# Patient Record
Sex: FEMALE | Race: BLACK OR AFRICAN AMERICAN | NOT HISPANIC OR LATINO | Employment: FULL TIME
[De-identification: names, ages, dates, MRNs, and addresses within clinical notes are randomized per-mention and may not be internally consistent; named-entity substitution may affect disease eponyms.]

---

## 2019-01-01 ENCOUNTER — EXTERNAL RECORD (OUTPATIENT)
Dept: HEALTH INFORMATION MANAGEMENT | Facility: OTHER | Age: 34
End: 2019-01-01

## 2019-06-19 ENCOUNTER — E-ADVICE (OUTPATIENT)
Dept: ENDOCRINOLOGY | Age: 34
End: 2019-06-19

## 2019-06-19 ENCOUNTER — OFFICE VISIT (OUTPATIENT)
Dept: ENDOCRINOLOGY | Age: 34
End: 2019-06-19

## 2019-06-19 VITALS
HEART RATE: 83 BPM | HEIGHT: 68 IN | WEIGHT: 235.78 LBS | SYSTOLIC BLOOD PRESSURE: 112 MMHG | DIASTOLIC BLOOD PRESSURE: 78 MMHG | BODY MASS INDEX: 35.73 KG/M2

## 2019-06-19 DIAGNOSIS — E11.65 TYPE 2 DIABETES MELLITUS WITH HYPERGLYCEMIA, WITHOUT LONG-TERM CURRENT USE OF INSULIN (CMD): Primary | ICD-10-CM

## 2019-06-19 DIAGNOSIS — E78.5 HYPERLIPIDEMIA, UNSPECIFIED HYPERLIPIDEMIA TYPE: ICD-10-CM

## 2019-06-19 DIAGNOSIS — E01.0 THYROMEGALY: ICD-10-CM

## 2019-06-19 PROCEDURE — 3074F SYST BP LT 130 MM HG: CPT | Performed by: INTERNAL MEDICINE

## 2019-06-19 PROCEDURE — 99204 OFFICE O/P NEW MOD 45 MIN: CPT | Performed by: INTERNAL MEDICINE

## 2019-06-19 PROCEDURE — 3078F DIAST BP <80 MM HG: CPT | Performed by: INTERNAL MEDICINE

## 2019-06-19 RX ORDER — LANCETS
EACH MISCELLANEOUS
Qty: 100 EACH | Refills: 3 | Status: SHIPPED | OUTPATIENT
Start: 2019-06-19 | End: 2019-06-24 | Stop reason: ALTCHOICE

## 2019-06-19 RX ORDER — METFORMIN HYDROCHLORIDE 500 MG/1
500 TABLET, EXTENDED RELEASE ORAL 2 TIMES DAILY WITH MEALS
Qty: 180 TABLET | Refills: 1 | Status: SHIPPED | OUTPATIENT
Start: 2019-06-19 | End: 2020-01-10 | Stop reason: SDUPTHER

## 2019-06-21 ENCOUNTER — TELEPHONE (OUTPATIENT)
Dept: SCHEDULING | Age: 34
End: 2019-06-21

## 2019-06-24 ENCOUNTER — LAB SERVICES (OUTPATIENT)
Dept: LAB | Age: 34
End: 2019-06-24

## 2019-06-24 ENCOUNTER — E-ADVICE (OUTPATIENT)
Dept: ENDOCRINOLOGY | Age: 34
End: 2019-06-24

## 2019-06-24 DIAGNOSIS — E11.65 TYPE 2 DIABETES MELLITUS WITH HYPERGLYCEMIA, WITHOUT LONG-TERM CURRENT USE OF INSULIN (CMD): ICD-10-CM

## 2019-06-24 PROCEDURE — 36415 COLL VENOUS BLD VENIPUNCTURE: CPT

## 2019-06-24 PROCEDURE — 84443 ASSAY THYROID STIM HORMONE: CPT | Performed by: INTERNAL MEDICINE

## 2019-06-24 PROCEDURE — 82043 UR ALBUMIN QUANTITATIVE: CPT | Performed by: INTERNAL MEDICINE

## 2019-06-24 RX ORDER — LANCETS
EACH MISCELLANEOUS
Qty: 100 EACH | Refills: 5 | Status: SHIPPED | OUTPATIENT
Start: 2019-06-24 | End: 2020-01-10 | Stop reason: ALTCHOICE

## 2019-06-24 RX ORDER — BLOOD-GLUCOSE METER
1 EACH MISCELLANEOUS ONCE
Qty: 1 KIT | Refills: 0 | Status: SHIPPED | OUTPATIENT
Start: 2019-06-24 | End: 2019-06-24

## 2019-06-25 LAB
CREAT UR-MCNC: 167 MG/DL
MICROALBUMIN UR-MCNC: 1.84 MG/DL
MICROALBUMIN/CREAT UR: 11 MG/G
TSH SERPL-ACNC: 1.41 MCUNITS/ML (ref 0.35–5)

## 2019-06-29 ENCOUNTER — E-ADVICE (OUTPATIENT)
Dept: ENDOCRINOLOGY | Age: 34
End: 2019-06-29

## 2019-12-26 ENCOUNTER — E-ADVICE (OUTPATIENT)
Dept: ENDOCRINOLOGY | Age: 34
End: 2019-12-26

## 2020-01-08 ENCOUNTER — TELEPHONE (OUTPATIENT)
Dept: SCHEDULING | Age: 35
End: 2020-01-08

## 2020-01-10 ENCOUNTER — OFFICE VISIT (OUTPATIENT)
Dept: ENDOCRINOLOGY | Age: 35
End: 2020-01-10

## 2020-01-10 ENCOUNTER — LAB SERVICES (OUTPATIENT)
Dept: LAB | Age: 35
End: 2020-01-10

## 2020-01-10 VITALS
WEIGHT: 235.56 LBS | HEART RATE: 80 BPM | SYSTOLIC BLOOD PRESSURE: 110 MMHG | HEIGHT: 68 IN | DIASTOLIC BLOOD PRESSURE: 74 MMHG | BODY MASS INDEX: 35.7 KG/M2

## 2020-01-10 DIAGNOSIS — E11.65 TYPE 2 DIABETES MELLITUS WITH HYPERGLYCEMIA, WITHOUT LONG-TERM CURRENT USE OF INSULIN (CMD): ICD-10-CM

## 2020-01-10 DIAGNOSIS — E78.5 HYPERLIPIDEMIA, UNSPECIFIED HYPERLIPIDEMIA TYPE: ICD-10-CM

## 2020-01-10 DIAGNOSIS — E11.65 TYPE 2 DIABETES MELLITUS WITH HYPERGLYCEMIA, WITHOUT LONG-TERM CURRENT USE OF INSULIN (CMD): Primary | ICD-10-CM

## 2020-01-10 DIAGNOSIS — E01.0 THYROMEGALY: ICD-10-CM

## 2020-01-10 LAB — GLUCOSE SERPL-MCNC: 331 MG/DL (ref 65–99)

## 2020-01-10 PROCEDURE — 85025 COMPLETE CBC W/AUTO DIFF WBC: CPT | Performed by: INTERNAL MEDICINE

## 2020-01-10 PROCEDURE — 82962 GLUCOSE BLOOD TEST: CPT | Performed by: INTERNAL MEDICINE

## 2020-01-10 PROCEDURE — 36415 COLL VENOUS BLD VENIPUNCTURE: CPT

## 2020-01-10 PROCEDURE — 80053 COMPREHEN METABOLIC PANEL: CPT | Performed by: INTERNAL MEDICINE

## 2020-01-10 PROCEDURE — 3074F SYST BP LT 130 MM HG: CPT | Performed by: INTERNAL MEDICINE

## 2020-01-10 PROCEDURE — 3078F DIAST BP <80 MM HG: CPT | Performed by: INTERNAL MEDICINE

## 2020-01-10 PROCEDURE — 99214 OFFICE O/P EST MOD 30 MIN: CPT | Performed by: INTERNAL MEDICINE

## 2020-01-10 PROCEDURE — 80061 LIPID PANEL: CPT | Performed by: INTERNAL MEDICINE

## 2020-01-10 PROCEDURE — 83036 HEMOGLOBIN GLYCOSYLATED A1C: CPT | Performed by: INTERNAL MEDICINE

## 2020-01-10 RX ORDER — LANCETS 33 GAUGE
EACH MISCELLANEOUS
Qty: 100 EACH | Refills: 11 | Status: SHIPPED | OUTPATIENT
Start: 2020-01-10 | End: 2021-04-30 | Stop reason: ALTCHOICE

## 2020-01-10 RX ORDER — BLOOD PRESSURE TEST KIT
KIT MISCELLANEOUS
Qty: 100 EACH | Refills: 11 | Status: SHIPPED | OUTPATIENT
Start: 2020-01-10 | End: 2021-04-30 | Stop reason: ALTCHOICE

## 2020-01-10 RX ORDER — METFORMIN HYDROCHLORIDE 500 MG/1
1000 TABLET, EXTENDED RELEASE ORAL 2 TIMES DAILY WITH MEALS
Qty: 360 TABLET | Refills: 1 | Status: SHIPPED | OUTPATIENT
Start: 2020-01-10 | End: 2020-07-16

## 2020-01-11 LAB
ALBUMIN SERPL-MCNC: 4.2 G/DL (ref 3.6–5.1)
ALBUMIN/GLOB SERPL: 1.2 {RATIO} (ref 1–2.4)
ALP SERPL-CCNC: 63 UNITS/L (ref 45–117)
ALT SERPL-CCNC: 49 UNITS/L
ANION GAP SERPL CALC-SCNC: 12 MMOL/L (ref 10–20)
AST SERPL-CCNC: 29 UNITS/L
BASOPHILS # BLD AUTO: 0.1 K/MCL (ref 0–0.3)
BASOPHILS NFR BLD AUTO: 1 %
BILIRUB SERPL-MCNC: 0.7 MG/DL (ref 0.2–1)
BUN SERPL-MCNC: 7 MG/DL (ref 6–20)
BUN/CREAT SERPL: 13 (ref 7–25)
CALCIUM SERPL-MCNC: 9.2 MG/DL (ref 8.4–10.2)
CHLORIDE SERPL-SCNC: 101 MMOL/L (ref 98–107)
CHOLEST SERPL-MCNC: 194 MG/DL
CHOLEST/HDLC SERPL: 4.4 {RATIO}
CO2 SERPL-SCNC: 27 MMOL/L (ref 21–32)
CREAT SERPL-MCNC: 0.56 MG/DL (ref 0.51–0.95)
DIFFERENTIAL METHOD BLD: NORMAL
EOSINOPHIL # BLD AUTO: 0.1 K/MCL (ref 0.1–0.5)
EOSINOPHIL NFR SPEC: 1 %
ERYTHROCYTE [DISTWIDTH] IN BLOOD: 12.9 % (ref 11–15)
FASTING STATUS PATIENT QL REPORTED: ABNORMAL HRS
GLOBULIN SER-MCNC: 3.5 G/DL (ref 2–4)
GLUCOSE SERPL-MCNC: 305 MG/DL (ref 65–99)
HBA1C MFR BLD: 11.2 % (ref 4.5–5.6)
HCT VFR BLD CALC: 43.8 % (ref 36–46.5)
HDLC SERPL-MCNC: 44 MG/DL
HGB BLD-MCNC: 14.6 G/DL (ref 12–15.5)
IMM GRANULOCYTES # BLD AUTO: 0 K/MCL (ref 0–0.2)
IMM GRANULOCYTES NFR BLD: 0 %
LDLC SERPL-MCNC: 118 MG/DL
LENGTH OF FAST TIME PATIENT: ABNORMAL HRS
LYMPHOCYTES # BLD MANUAL: 1.9 K/MCL (ref 1–4.8)
LYMPHOCYTES NFR BLD MANUAL: 38 %
MCH RBC QN AUTO: 30.6 PG (ref 26–34)
MCHC RBC AUTO-ENTMCNC: 33.3 G/DL (ref 32–36.5)
MCV RBC AUTO: 91.8 FL (ref 78–100)
MONOCYTES # BLD MANUAL: 0.5 K/MCL (ref 0.3–0.9)
MONOCYTES NFR BLD MANUAL: 10 %
NEUTROPHILS # BLD: 2.4 K/MCL (ref 1.8–7.7)
NEUTROPHILS NFR BLD AUTO: 50 %
NONHDLC SERPL-MCNC: 150 MG/DL
NRBC BLD MANUAL-RTO: 0 /100 WBC
PLATELET # BLD: 269 K/MCL (ref 140–450)
POTASSIUM SERPL-SCNC: 4.4 MMOL/L (ref 3.4–5.1)
PROT SERPL-MCNC: 7.7 G/DL (ref 6.4–8.2)
RBC # BLD: 4.77 MIL/MCL (ref 4–5.2)
SODIUM SERPL-SCNC: 136 MMOL/L (ref 135–145)
TRIGL SERPL-MCNC: 161 MG/DL
WBC # BLD: 4.9 K/MCL (ref 4.2–11)

## 2020-02-07 ENCOUNTER — E-ADVICE (OUTPATIENT)
Dept: ENDOCRINOLOGY | Age: 35
End: 2020-02-07

## 2020-07-16 DIAGNOSIS — E11.65 TYPE 2 DIABETES MELLITUS WITH HYPERGLYCEMIA, WITHOUT LONG-TERM CURRENT USE OF INSULIN (CMD): ICD-10-CM

## 2020-07-16 RX ORDER — METFORMIN HYDROCHLORIDE 500 MG/1
TABLET, EXTENDED RELEASE ORAL
Qty: 360 TABLET | Refills: 1 | Status: ON HOLD | OUTPATIENT
Start: 2020-07-16 | End: 2022-04-04 | Stop reason: ALTCHOICE

## 2021-01-24 DIAGNOSIS — E11.65 TYPE 2 DIABETES MELLITUS WITH HYPERGLYCEMIA, WITHOUT LONG-TERM CURRENT USE OF INSULIN (CMD): ICD-10-CM

## 2021-01-25 RX ORDER — DULAGLUTIDE 0.75 MG/.5ML
INJECTION, SOLUTION SUBCUTANEOUS
Qty: 2 ML | Refills: 1 | OUTPATIENT
Start: 2021-01-25

## 2021-01-26 ENCOUNTER — TELEPHONE (OUTPATIENT)
Dept: FAMILY MEDICINE | Age: 36
End: 2021-01-26

## 2021-01-26 DIAGNOSIS — E11.65 TYPE 2 DIABETES MELLITUS WITH HYPERGLYCEMIA, WITHOUT LONG-TERM CURRENT USE OF INSULIN (CMD): Primary | ICD-10-CM

## 2021-01-27 DIAGNOSIS — E11.65 TYPE 2 DIABETES MELLITUS WITH HYPERGLYCEMIA, WITHOUT LONG-TERM CURRENT USE OF INSULIN (CMD): ICD-10-CM

## 2021-03-16 ENCOUNTER — TELEPHONE (OUTPATIENT)
Dept: SCHEDULING | Age: 36
End: 2021-03-16

## 2021-03-17 ENCOUNTER — APPOINTMENT (OUTPATIENT)
Dept: FAMILY MEDICINE | Age: 36
End: 2021-03-17

## 2021-04-02 ENCOUNTER — OFFICE VISIT (OUTPATIENT)
Dept: FAMILY MEDICINE | Age: 36
End: 2021-04-02

## 2021-04-02 VITALS
WEIGHT: 249.16 LBS | HEART RATE: 94 BPM | OXYGEN SATURATION: 96 % | BODY MASS INDEX: 37.76 KG/M2 | HEIGHT: 68 IN | DIASTOLIC BLOOD PRESSURE: 90 MMHG | SYSTOLIC BLOOD PRESSURE: 164 MMHG

## 2021-04-02 DIAGNOSIS — E11.65 TYPE 2 DIABETES MELLITUS WITH HYPERGLYCEMIA, WITHOUT LONG-TERM CURRENT USE OF INSULIN (CMD): ICD-10-CM

## 2021-04-02 DIAGNOSIS — R03.0 ELEVATED BLOOD PRESSURE READING WITHOUT DIAGNOSIS OF HYPERTENSION: ICD-10-CM

## 2021-04-02 DIAGNOSIS — R19.5 MUCOUS IN STOOLS: Primary | ICD-10-CM

## 2021-04-02 DIAGNOSIS — E66.01 CLASS 2 SEVERE OBESITY DUE TO EXCESS CALORIES WITH SERIOUS COMORBIDITY AND BODY MASS INDEX (BMI) OF 37.0 TO 37.9 IN ADULT (CMD): ICD-10-CM

## 2021-04-02 PROBLEM — E66.812 CLASS 2 SEVERE OBESITY DUE TO EXCESS CALORIES WITH SERIOUS COMORBIDITY AND BODY MASS INDEX (BMI) OF 37.0 TO 37.9 IN ADULT (CMD): Status: ACTIVE | Noted: 2021-04-02

## 2021-04-02 PROCEDURE — 99203 OFFICE O/P NEW LOW 30 MIN: CPT | Performed by: FAMILY MEDICINE

## 2021-04-02 PROCEDURE — 3077F SYST BP >= 140 MM HG: CPT | Performed by: FAMILY MEDICINE

## 2021-04-02 SDOH — HEALTH STABILITY: MENTAL HEALTH: HOW OFTEN DO YOU HAVE A DRINK CONTAINING ALCOHOL?: NEVER

## 2021-04-02 ASSESSMENT — PATIENT HEALTH QUESTIONNAIRE - PHQ9
1. LITTLE INTEREST OR PLEASURE IN DOING THINGS: NOT AT ALL
2. FEELING DOWN, DEPRESSED OR HOPELESS: NOT AT ALL
SUM OF ALL RESPONSES TO PHQ9 QUESTIONS 1 AND 2: 0
SUM OF ALL RESPONSES TO PHQ9 QUESTIONS 1 AND 2: 0
CLINICAL INTERPRETATION OF PHQ9 SCORE: NO FURTHER SCREENING NEEDED
CLINICAL INTERPRETATION OF PHQ2 SCORE: NO FURTHER SCREENING NEEDED

## 2021-04-05 ENCOUNTER — TELEPHONE (OUTPATIENT)
Dept: SCHEDULING | Age: 36
End: 2021-04-05

## 2021-04-08 ASSESSMENT — ENCOUNTER SYMPTOMS
ACTIVITY CHANGE: 0
EYE PAIN: 0
SHORTNESS OF BREATH: 0
WEAKNESS: 0
DIARRHEA: 0
CHILLS: 0
VOMITING: 0
CONFUSION: 0
WHEEZING: 0
DIZZINESS: 0
RHINORRHEA: 0
EYE DISCHARGE: 0
ABDOMINAL PAIN: 0
WOUND: 0
SORE THROAT: 0
AGITATION: 0
SLEEP DISTURBANCE: 0
NUMBNESS: 0
FEVER: 0
HEADACHES: 0
NAUSEA: 0
CONSTIPATION: 0
COUGH: 0
ROS GI COMMENTS: NEGATIVE EXCEPT HPI

## 2021-04-09 ENCOUNTER — TELEPHONE (OUTPATIENT)
Dept: SCHEDULING | Age: 36
End: 2021-04-09

## 2021-04-15 ENCOUNTER — LAB SERVICES (OUTPATIENT)
Dept: LAB | Age: 36
End: 2021-04-15

## 2021-04-15 DIAGNOSIS — E11.65 TYPE 2 DIABETES MELLITUS WITH HYPERGLYCEMIA, WITHOUT LONG-TERM CURRENT USE OF INSULIN (CMD): ICD-10-CM

## 2021-04-15 DIAGNOSIS — R19.5 MUCOUS IN STOOLS: ICD-10-CM

## 2021-04-15 DIAGNOSIS — R03.0 ELEVATED BLOOD PRESSURE READING WITHOUT DIAGNOSIS OF HYPERTENSION: ICD-10-CM

## 2021-04-15 PROCEDURE — 82570 ASSAY OF URINE CREATININE: CPT | Performed by: INTERNAL MEDICINE

## 2021-04-15 PROCEDURE — 82306 VITAMIN D 25 HYDROXY: CPT | Performed by: FAMILY MEDICINE

## 2021-04-15 PROCEDURE — 36415 COLL VENOUS BLD VENIPUNCTURE: CPT

## 2021-04-15 PROCEDURE — 82043 UR ALBUMIN QUANTITATIVE: CPT | Performed by: INTERNAL MEDICINE

## 2021-04-15 PROCEDURE — 80061 LIPID PANEL: CPT | Performed by: INTERNAL MEDICINE

## 2021-04-15 PROCEDURE — 83036 HEMOGLOBIN GLYCOSYLATED A1C: CPT | Performed by: INTERNAL MEDICINE

## 2021-04-15 PROCEDURE — 85027 COMPLETE CBC AUTOMATED: CPT | Performed by: FAMILY MEDICINE

## 2021-04-15 PROCEDURE — 86140 C-REACTIVE PROTEIN: CPT | Performed by: FAMILY MEDICINE

## 2021-04-15 PROCEDURE — 82746 ASSAY OF FOLIC ACID SERUM: CPT | Performed by: FAMILY MEDICINE

## 2021-04-15 PROCEDURE — 84443 ASSAY THYROID STIM HORMONE: CPT | Performed by: FAMILY MEDICINE

## 2021-04-15 PROCEDURE — 80053 COMPREHEN METABOLIC PANEL: CPT | Performed by: INTERNAL MEDICINE

## 2021-04-15 PROCEDURE — 82607 VITAMIN B-12: CPT | Performed by: FAMILY MEDICINE

## 2021-04-16 LAB
25(OH)D3+25(OH)D2 SERPL-MCNC: 6.5 NG/ML (ref 30–100)
ALBUMIN SERPL-MCNC: 4 G/DL (ref 3.6–5.1)
ALBUMIN/GLOB SERPL: 1.1 {RATIO} (ref 1–2.4)
ALP SERPL-CCNC: 59 UNITS/L (ref 45–117)
ALT SERPL-CCNC: 63 UNITS/L
ANION GAP SERPL CALC-SCNC: 10 MMOL/L (ref 10–20)
AST SERPL-CCNC: 38 UNITS/L
BILIRUB SERPL-MCNC: 0.3 MG/DL (ref 0.2–1)
BUN SERPL-MCNC: 9 MG/DL (ref 6–20)
BUN/CREAT SERPL: 14 (ref 7–25)
CALCIUM SERPL-MCNC: 9.5 MG/DL (ref 8.4–10.2)
CHLORIDE SERPL-SCNC: 104 MMOL/L (ref 98–107)
CHOLEST SERPL-MCNC: 183 MG/DL
CHOLEST/HDLC SERPL: 4.5 {RATIO}
CO2 SERPL-SCNC: 28 MMOL/L (ref 21–32)
CREAT SERPL-MCNC: 0.63 MG/DL (ref 0.51–0.95)
CREAT UR-MCNC: 245 MG/DL
CRP SERPL-MCNC: 0.8 MG/DL
DEPRECATED RDW RBC: 42.4 FL (ref 39–50)
ERYTHROCYTE [DISTWIDTH] IN BLOOD: 12.8 % (ref 11–15)
FASTING DURATION TIME PATIENT: ABNORMAL H
FASTING DURATION TIME PATIENT: ABNORMAL H
FOLATE SERPL-MCNC: >24 NG/ML
GFR SERPLBLD BASED ON 1.73 SQ M-ARVRAT: >90 ML/MIN/1.73M2
GLOBULIN SER-MCNC: 3.5 G/DL (ref 2–4)
GLUCOSE SERPL-MCNC: 215 MG/DL (ref 65–99)
HBA1C MFR BLD: 9.4 % (ref 4.5–5.6)
HCT VFR BLD CALC: 43 % (ref 36–46.5)
HDLC SERPL-MCNC: 41 MG/DL
HGB BLD-MCNC: 14.2 G/DL (ref 12–15.5)
LDLC SERPL CALC-MCNC: 100 MG/DL
MCH RBC QN AUTO: 30.3 PG (ref 26–34)
MCHC RBC AUTO-ENTMCNC: 33 G/DL (ref 32–36.5)
MCV RBC AUTO: 91.7 FL (ref 78–100)
MICROALBUMIN UR-MCNC: 2.21 MG/DL
MICROALBUMIN/CREAT UR: 9 MG/G
NONHDLC SERPL-MCNC: 142 MG/DL
NRBC BLD MANUAL-RTO: 0 /100 WBC
PLATELET # BLD AUTO: 313 K/MCL (ref 140–450)
POTASSIUM SERPL-SCNC: 3.9 MMOL/L (ref 3.4–5.1)
PROT SERPL-MCNC: 7.5 G/DL (ref 6.4–8.2)
RBC # BLD: 4.69 MIL/MCL (ref 4–5.2)
SODIUM SERPL-SCNC: 138 MMOL/L (ref 135–145)
TRIGL SERPL-MCNC: 208 MG/DL
TSH SERPL-ACNC: 0.98 MCUNITS/ML (ref 0.35–5)
VIT B12 SERPL-MCNC: 579 PG/ML (ref 211–911)
WBC # BLD: 5.7 K/MCL (ref 4.2–11)

## 2021-04-17 RX ORDER — ERGOCALCIFEROL 1.25 MG/1
50000 CAPSULE ORAL
Qty: 8 CAPSULE | Refills: 0 | Status: SHIPPED | OUTPATIENT
Start: 2021-04-19 | End: 2021-05-07

## 2021-04-30 ENCOUNTER — E-ADVICE (OUTPATIENT)
Dept: ENDOCRINOLOGY | Age: 36
End: 2021-04-30

## 2021-04-30 ENCOUNTER — OFFICE VISIT (OUTPATIENT)
Dept: ENDOCRINOLOGY | Age: 36
End: 2021-04-30

## 2021-04-30 VITALS
BODY MASS INDEX: 37.46 KG/M2 | WEIGHT: 247.14 LBS | DIASTOLIC BLOOD PRESSURE: 80 MMHG | SYSTOLIC BLOOD PRESSURE: 122 MMHG | HEIGHT: 68 IN | HEART RATE: 90 BPM

## 2021-04-30 DIAGNOSIS — E78.5 HYPERLIPIDEMIA, UNSPECIFIED HYPERLIPIDEMIA TYPE: ICD-10-CM

## 2021-04-30 DIAGNOSIS — E11.65 TYPE 2 DIABETES MELLITUS WITH HYPERGLYCEMIA, WITHOUT LONG-TERM CURRENT USE OF INSULIN (CMD): Primary | ICD-10-CM

## 2021-04-30 DIAGNOSIS — Z30.02 COUNSELING IN NATURAL FAMILY PLANNING TO AVOID PREGNANCY: ICD-10-CM

## 2021-04-30 PROCEDURE — 3079F DIAST BP 80-89 MM HG: CPT | Performed by: INTERNAL MEDICINE

## 2021-04-30 PROCEDURE — 3074F SYST BP LT 130 MM HG: CPT | Performed by: INTERNAL MEDICINE

## 2021-04-30 PROCEDURE — 99214 OFFICE O/P EST MOD 30 MIN: CPT | Performed by: INTERNAL MEDICINE

## 2021-04-30 RX ORDER — INSULIN DETEMIR 100 [IU]/ML
INJECTION, SOLUTION SUBCUTANEOUS
Qty: 15 ML | Refills: 5 | Status: ON HOLD | OUTPATIENT
Start: 2021-04-30 | End: 2022-04-04 | Stop reason: ALTCHOICE

## 2021-04-30 RX ORDER — INSULIN ASPART 100 [IU]/ML
INJECTION, SOLUTION INTRAVENOUS; SUBCUTANEOUS
Qty: 15 ML | Refills: 5 | Status: ON HOLD | OUTPATIENT
Start: 2021-04-30 | End: 2022-04-07 | Stop reason: HOSPADM

## 2021-04-30 ASSESSMENT — PATIENT HEALTH QUESTIONNAIRE - PHQ9
CLINICAL INTERPRETATION OF PHQ2 SCORE: NO FURTHER SCREENING NEEDED
CLINICAL INTERPRETATION OF PHQ9 SCORE: NO FURTHER SCREENING NEEDED
1. LITTLE INTEREST OR PLEASURE IN DOING THINGS: NOT AT ALL
2. FEELING DOWN, DEPRESSED OR HOPELESS: NOT AT ALL
SUM OF ALL RESPONSES TO PHQ9 QUESTIONS 1 AND 2: 0
SUM OF ALL RESPONSES TO PHQ9 QUESTIONS 1 AND 2: 0

## 2021-05-07 RX ORDER — MULTIVIT-MIN/IRON/FOLIC ACID/K 18-600-40
50 CAPSULE ORAL DAILY
Qty: 90 CAPSULE | Refills: 3 | Status: ON HOLD | OUTPATIENT
Start: 2021-05-07 | End: 2022-04-04 | Stop reason: ALTCHOICE

## 2021-05-11 ENCOUNTER — APPOINTMENT (OUTPATIENT)
Dept: EDUCATION SERVICES | Age: 36
End: 2021-05-11
Attending: INTERNAL MEDICINE

## 2021-05-12 DIAGNOSIS — E11.65 TYPE 2 DIABETES MELLITUS WITH HYPERGLYCEMIA, WITHOUT LONG-TERM CURRENT USE OF INSULIN (CMD): ICD-10-CM

## 2021-05-12 RX ORDER — DULAGLUTIDE 0.75 MG/.5ML
INJECTION, SOLUTION SUBCUTANEOUS
Qty: 2 ML | Refills: 1 | OUTPATIENT
Start: 2021-05-12

## 2021-05-27 ENCOUNTER — TELEPHONE (OUTPATIENT)
Dept: GASTROENTEROLOGY | Age: 36
End: 2021-05-27

## 2021-06-01 ENCOUNTER — APPOINTMENT (OUTPATIENT)
Dept: GASTROENTEROLOGY | Age: 36
End: 2021-06-01

## 2021-06-28 ENCOUNTER — OFFICE VISIT (OUTPATIENT)
Dept: FAMILY MEDICINE | Age: 36
End: 2021-06-28

## 2021-06-28 ENCOUNTER — TELEPHONE (OUTPATIENT)
Dept: FAMILY MEDICINE | Age: 36
End: 2021-06-28

## 2021-06-28 VITALS
HEIGHT: 68 IN | OXYGEN SATURATION: 98 % | BODY MASS INDEX: 36.27 KG/M2 | WEIGHT: 239.33 LBS | HEART RATE: 67 BPM | SYSTOLIC BLOOD PRESSURE: 113 MMHG | DIASTOLIC BLOOD PRESSURE: 62 MMHG | TEMPERATURE: 98 F | RESPIRATION RATE: 18 BRPM

## 2021-06-28 DIAGNOSIS — R03.0 ELEVATED BLOOD PRESSURE READING WITHOUT DIAGNOSIS OF HYPERTENSION: ICD-10-CM

## 2021-06-28 DIAGNOSIS — E11.65 TYPE 2 DIABETES MELLITUS WITH HYPERGLYCEMIA, WITHOUT LONG-TERM CURRENT USE OF INSULIN (CMD): Primary | ICD-10-CM

## 2021-06-28 PROCEDURE — 99214 OFFICE O/P EST MOD 30 MIN: CPT | Performed by: FAMILY MEDICINE

## 2021-06-28 PROCEDURE — 3078F DIAST BP <80 MM HG: CPT | Performed by: FAMILY MEDICINE

## 2021-06-28 PROCEDURE — 3074F SYST BP LT 130 MM HG: CPT | Performed by: FAMILY MEDICINE

## 2021-06-28 RX ORDER — FLUCONAZOLE 150 MG/1
150 TABLET ORAL ONCE
Qty: 1 TABLET | Refills: 0 | Status: SHIPPED | OUTPATIENT
Start: 2021-06-28 | End: 2021-06-28

## 2021-06-28 ASSESSMENT — PATIENT HEALTH QUESTIONNAIRE - PHQ9
CLINICAL INTERPRETATION OF PHQ2 SCORE: NO FURTHER SCREENING NEEDED
SUM OF ALL RESPONSES TO PHQ9 QUESTIONS 1 AND 2: 2
1. LITTLE INTEREST OR PLEASURE IN DOING THINGS: SEVERAL DAYS
CLINICAL INTERPRETATION OF PHQ9 SCORE: NO FURTHER SCREENING NEEDED
2. FEELING DOWN, DEPRESSED OR HOPELESS: SEVERAL DAYS
SUM OF ALL RESPONSES TO PHQ9 QUESTIONS 1 AND 2: 2

## 2021-06-28 ASSESSMENT — ENCOUNTER SYMPTOMS
ENDOCRINE NEGATIVE: 1
CONSTITUTIONAL NEGATIVE: 1
PSYCHIATRIC NEGATIVE: 1
ALLERGIC/IMMUNOLOGIC NEGATIVE: 1
HEMATOLOGIC/LYMPHATIC NEGATIVE: 1
EYES NEGATIVE: 1
NEUROLOGICAL NEGATIVE: 1
RESPIRATORY NEGATIVE: 1
GASTROINTESTINAL NEGATIVE: 1

## 2021-06-28 ASSESSMENT — PAIN SCALES - GENERAL: PAINLEVEL: 0

## 2021-08-22 ENCOUNTER — HOSPITAL ENCOUNTER (EMERGENCY)
Age: 36
Discharge: HOME | End: 2021-08-22
Attending: EMERGENCY MEDICINE

## 2021-08-22 VITALS
TEMPERATURE: 96 F | DIASTOLIC BLOOD PRESSURE: 66 MMHG | OXYGEN SATURATION: 98 % | SYSTOLIC BLOOD PRESSURE: 122 MMHG | HEART RATE: 83 BPM | BODY MASS INDEX: 37.07 KG/M2 | WEIGHT: 243.83 LBS | RESPIRATION RATE: 16 BRPM

## 2021-08-22 DIAGNOSIS — Z33.1 PREGNANCY AS INCIDENTAL FINDING: ICD-10-CM

## 2021-08-22 DIAGNOSIS — R03.0 ELEVATED BLOOD PRESSURE READING WITHOUT DIAGNOSIS OF HYPERTENSION: ICD-10-CM

## 2021-08-22 DIAGNOSIS — R51.9 ACUTE NONINTRACTABLE HEADACHE, UNSPECIFIED HEADACHE TYPE: Primary | ICD-10-CM

## 2021-08-22 LAB
ALBUMIN SERPL-MCNC: 3.7 G/DL (ref 3.6–5.1)
ALBUMIN/GLOB SERPL: 1 {RATIO} (ref 1–2.4)
ALP SERPL-CCNC: 48 UNITS/L (ref 45–117)
ALT SERPL-CCNC: 34 UNITS/L
ANION GAP SERPL CALC-SCNC: 10 MMOL/L (ref 10–20)
APPEARANCE UR: CLEAR
AST SERPL-CCNC: 17 UNITS/L
BACTERIA #/AREA URNS HPF: ABNORMAL /HPF
BASOPHILS # BLD: 0.1 K/MCL (ref 0–0.3)
BASOPHILS NFR BLD: 1 %
BILIRUB SERPL-MCNC: 0.3 MG/DL (ref 0.2–1)
BILIRUB UR QL STRIP: NEGATIVE
BUN SERPL-MCNC: 9 MG/DL (ref 6–20)
BUN/CREAT SERPL: 16 (ref 7–25)
CALCIUM SERPL-MCNC: 9 MG/DL (ref 8.4–10.2)
CHLORIDE SERPL-SCNC: 102 MMOL/L (ref 98–107)
CO2 SERPL-SCNC: 27 MMOL/L (ref 21–32)
COLOR UR: YELLOW
CREAT SERPL-MCNC: 0.56 MG/DL (ref 0.51–0.95)
DEPRECATED RDW RBC: 39 FL (ref 39–50)
EOSINOPHIL # BLD: 0.1 K/MCL (ref 0–0.5)
EOSINOPHIL NFR BLD: 1 %
ERYTHROCYTE [DISTWIDTH] IN BLOOD: 12.3 % (ref 11–15)
FASTING DURATION TIME PATIENT: ABNORMAL H
GFR SERPLBLD BASED ON 1.73 SQ M-ARVRAT: >90 ML/MIN/1.73M2
GLOBULIN SER-MCNC: 3.7 G/DL (ref 2–4)
GLUCOSE SERPL-MCNC: 180 MG/DL (ref 65–99)
GLUCOSE UR STRIP-MCNC: 500 MG/DL
HCG SERPL-ACNC: 9647 MUNITS/ML
HCG UR QL: POSITIVE
HCT VFR BLD CALC: 38.4 % (ref 36–46.5)
HGB BLD-MCNC: 13.6 G/DL (ref 12–15.5)
HGB UR QL STRIP: NEGATIVE
HYALINE CASTS #/AREA URNS LPF: ABNORMAL /LPF
IMM GRANULOCYTES # BLD AUTO: 0 K/MCL (ref 0–0.2)
IMM GRANULOCYTES # BLD: 0 %
KETONES UR STRIP-MCNC: 15 MG/DL
LEUKOCYTE ESTERASE UR QL STRIP: NEGATIVE
LYMPHOCYTES # BLD: 2.3 K/MCL (ref 1–4.8)
LYMPHOCYTES NFR BLD: 29 %
MAGNESIUM SERPL-MCNC: 1.9 MG/DL (ref 1.7–2.4)
MCH RBC QN AUTO: 30.5 PG (ref 26–34)
MCHC RBC AUTO-ENTMCNC: 35.4 G/DL (ref 32–36.5)
MCV RBC AUTO: 86.1 FL (ref 78–100)
MONOCYTES # BLD: 0.7 K/MCL (ref 0.3–0.9)
MONOCYTES NFR BLD: 9 %
NEUTROPHILS # BLD: 4.8 K/MCL (ref 1.8–7.7)
NEUTROPHILS NFR BLD: 60 %
NITRITE UR QL STRIP: NEGATIVE
NRBC BLD MANUAL-RTO: 0 /100 WBC
PH UR STRIP: 5.5 [PH] (ref 5–7)
PLATELET # BLD AUTO: 287 K/MCL (ref 140–450)
POTASSIUM SERPL-SCNC: 3.6 MMOL/L (ref 3.4–5.1)
PROT SERPL-MCNC: 7.4 G/DL (ref 6.4–8.2)
PROT UR STRIP-MCNC: NEGATIVE MG/DL
RBC # BLD: 4.46 MIL/MCL (ref 4–5.2)
RBC #/AREA URNS HPF: ABNORMAL /HPF
SODIUM SERPL-SCNC: 135 MMOL/L (ref 135–145)
SP GR UR STRIP: >1.03 (ref 1–1.03)
SQUAMOUS #/AREA URNS HPF: ABNORMAL /HPF
UROBILINOGEN UR STRIP-MCNC: 0.2 MG/DL
WBC # BLD: 7.9 K/MCL (ref 4.2–11)
WBC #/AREA URNS HPF: ABNORMAL /HPF

## 2021-08-22 PROCEDURE — 96376 TX/PRO/DX INJ SAME DRUG ADON: CPT

## 2021-08-22 PROCEDURE — 99283 EMERGENCY DEPT VISIT LOW MDM: CPT

## 2021-08-22 PROCEDURE — 10002803 HB RX 637: Performed by: EMERGENCY MEDICINE

## 2021-08-22 PROCEDURE — 96375 TX/PRO/DX INJ NEW DRUG ADDON: CPT

## 2021-08-22 PROCEDURE — 10002800 HB RX 250 W HCPCS: Performed by: EMERGENCY MEDICINE

## 2021-08-22 PROCEDURE — 84703 CHORIONIC GONADOTROPIN ASSAY: CPT

## 2021-08-22 PROCEDURE — 85025 COMPLETE CBC W/AUTO DIFF WBC: CPT | Performed by: EMERGENCY MEDICINE

## 2021-08-22 PROCEDURE — 84702 CHORIONIC GONADOTROPIN TEST: CPT | Performed by: EMERGENCY MEDICINE

## 2021-08-22 PROCEDURE — 80053 COMPREHEN METABOLIC PANEL: CPT | Performed by: EMERGENCY MEDICINE

## 2021-08-22 PROCEDURE — 81001 URINALYSIS AUTO W/SCOPE: CPT | Performed by: EMERGENCY MEDICINE

## 2021-08-22 PROCEDURE — 10002807 HB RX 258: Performed by: EMERGENCY MEDICINE

## 2021-08-22 PROCEDURE — 83735 ASSAY OF MAGNESIUM: CPT | Performed by: EMERGENCY MEDICINE

## 2021-08-22 PROCEDURE — 96361 HYDRATE IV INFUSION ADD-ON: CPT

## 2021-08-22 PROCEDURE — 99283 EMERGENCY DEPT VISIT LOW MDM: CPT | Performed by: EMERGENCY MEDICINE

## 2021-08-22 PROCEDURE — 96365 THER/PROPH/DIAG IV INF INIT: CPT

## 2021-08-22 RX ORDER — DIPHENHYDRAMINE HYDROCHLORIDE 50 MG/ML
25 INJECTION INTRAMUSCULAR; INTRAVENOUS ONCE
Status: COMPLETED | OUTPATIENT
Start: 2021-08-22 | End: 2021-08-22

## 2021-08-22 RX ORDER — METOCLOPRAMIDE HYDROCHLORIDE 5 MG/ML
5 INJECTION INTRAMUSCULAR; INTRAVENOUS ONCE
Status: COMPLETED | OUTPATIENT
Start: 2021-08-22 | End: 2021-08-22

## 2021-08-22 RX ORDER — DEXAMETHASONE SODIUM PHOSPHATE 4 MG/ML
8 INJECTION, SOLUTION INTRA-ARTICULAR; INTRALESIONAL; INTRAMUSCULAR; INTRAVENOUS; SOFT TISSUE ONCE
Status: DISCONTINUED | OUTPATIENT
Start: 2021-08-22 | End: 2021-08-23 | Stop reason: HOSPADM

## 2021-08-22 RX ORDER — IBUPROFEN 600 MG/1
600 TABLET ORAL ONCE
Status: COMPLETED | OUTPATIENT
Start: 2021-08-22 | End: 2021-08-22

## 2021-08-22 RX ADMIN — METOCLOPRAMIDE HYDROCHLORIDE 5 MG: 5 INJECTION INTRAMUSCULAR; INTRAVENOUS at 19:04

## 2021-08-22 RX ADMIN — DIPHENHYDRAMINE HYDROCHLORIDE 25 MG: 50 INJECTION INTRAMUSCULAR; INTRAVENOUS at 17:52

## 2021-08-22 RX ADMIN — METOCLOPRAMIDE HYDROCHLORIDE 5 MG: 5 INJECTION INTRAMUSCULAR; INTRAVENOUS at 17:53

## 2021-08-22 RX ADMIN — IBUPROFEN 600 MG: 600 TABLET ORAL at 16:07

## 2021-08-22 RX ADMIN — DIPHENHYDRAMINE HYDROCHLORIDE 25 MG: 50 INJECTION INTRAMUSCULAR; INTRAVENOUS at 19:05

## 2021-08-22 RX ADMIN — MAGNESIUM SULFATE HEPTAHYDRATE 2 G: 40 INJECTION, SOLUTION INTRAVENOUS at 17:50

## 2021-08-22 RX ADMIN — SODIUM CHLORIDE 1000 ML: 0.9 INJECTION, SOLUTION INTRAVENOUS at 17:51

## 2021-08-22 ASSESSMENT — ENCOUNTER SYMPTOMS
BRUISES/BLEEDS EASILY: 0
SEIZURES: 0
HEADACHES: 1
PHOTOPHOBIA: 0
VOMITING: 0
HALLUCINATIONS: 0
POLYPHAGIA: 0
ABDOMINAL PAIN: 0
SORE THROAT: 0
CHEST TIGHTNESS: 0
FEVER: 0
DIAPHORESIS: 0
ADENOPATHY: 0
NAUSEA: 0
SHORTNESS OF BREATH: 0
ABDOMINAL DISTENTION: 0
SPEECH DIFFICULTY: 0
WEAKNESS: 0
POLYDIPSIA: 0
NUMBNESS: 0
COUGH: 0
CHILLS: 0

## 2021-08-22 ASSESSMENT — PAIN SCALES - GENERAL
PAINLEVEL_OUTOF10: 10
PAINLEVEL_OUTOF10: 10

## 2021-08-23 LAB
RAINBOW EXTRA TUBES HOLD SPECIMEN: NORMAL

## 2021-09-08 LAB
CYTOLOGY CVX/VAG DOC THIN PREP: NORMAL
HBV SURFACE AG SER QL: NEGATIVE
HIV 1+2 AB+HIV1 P24 AG SERPL QL IA: NORMAL
HPV16+18+45 E6+E7MRNA CVX NAA+PROBE: NEGATIVE
RPR SER QL: NORMAL
RUBV IGG SERPL IA-ACNC: NORMAL

## 2021-09-09 DIAGNOSIS — E11.9 DM (DIABETES MELLITUS), TYPE 2 (CMD): Primary | ICD-10-CM

## 2021-09-10 ENCOUNTER — TELEPHONE (OUTPATIENT)
Dept: SCHEDULING | Age: 36
End: 2021-09-10

## 2021-09-13 ENCOUNTER — ANCILLARY PROCEDURE (OUTPATIENT)
Dept: CARDIOLOGY | Age: 36
End: 2021-09-13
Attending: OBSTETRICS & GYNECOLOGY

## 2021-09-13 DIAGNOSIS — E11.65 TYPE 2 DIABETES MELLITUS WITH HYPERGLYCEMIA (CMD): Primary | ICD-10-CM

## 2021-09-13 DIAGNOSIS — E11.65 TYPE 2 DIABETES MELLITUS WITH HYPERGLYCEMIA (CMD): ICD-10-CM

## 2021-09-13 PROCEDURE — 93010 ELECTROCARDIOGRAM REPORT: CPT | Performed by: INTERNAL MEDICINE

## 2021-09-15 DIAGNOSIS — E11.65 UNCONTROLLED TYPE 2 DIABETES MELLITUS WITH HYPERGLYCEMIA (CMD): Primary | ICD-10-CM

## 2021-09-30 PROBLEM — O09.521 MULTIGRAVIDA OF ADVANCED MATERNAL AGE IN FIRST TRIMESTER (CMD): Status: ACTIVE | Noted: 2021-09-30

## 2021-10-01 ENCOUNTER — OFFICE VISIT (OUTPATIENT)
Dept: MATERNAL FETAL MEDICINE | Age: 36
End: 2021-10-01

## 2021-10-01 ENCOUNTER — APPOINTMENT (OUTPATIENT)
Dept: LAB | Age: 36
End: 2021-10-01

## 2021-10-01 VITALS
HEART RATE: 75 BPM | BODY MASS INDEX: 36.69 KG/M2 | DIASTOLIC BLOOD PRESSURE: 69 MMHG | HEIGHT: 68 IN | SYSTOLIC BLOOD PRESSURE: 118 MMHG | WEIGHT: 242.06 LBS

## 2021-10-01 DIAGNOSIS — E11.65 TYPE 2 DIABETES MELLITUS WITH HYPERGLYCEMIA, WITHOUT LONG-TERM CURRENT USE OF INSULIN (CMD): ICD-10-CM

## 2021-10-01 DIAGNOSIS — E66.01 CLASS 2 SEVERE OBESITY DUE TO EXCESS CALORIES WITH SERIOUS COMORBIDITY AND BODY MASS INDEX (BMI) OF 37.0 TO 37.9 IN ADULT (CMD): ICD-10-CM

## 2021-10-01 DIAGNOSIS — Z3A.12 12 WEEKS GESTATION OF PREGNANCY: Primary | ICD-10-CM

## 2021-10-01 DIAGNOSIS — O09.521 MULTIGRAVIDA OF ADVANCED MATERNAL AGE IN FIRST TRIMESTER: ICD-10-CM

## 2021-10-01 PROCEDURE — 3078F DIAST BP <80 MM HG: CPT | Performed by: OBSTETRICS & GYNECOLOGY

## 2021-10-01 PROCEDURE — 99204 OFFICE O/P NEW MOD 45 MIN: CPT | Performed by: OBSTETRICS & GYNECOLOGY

## 2021-10-01 PROCEDURE — 3074F SYST BP LT 130 MM HG: CPT | Performed by: OBSTETRICS & GYNECOLOGY

## 2021-10-01 RX ORDER — PEN NEEDLE, DIABETIC 29 G X1/2"
NEEDLE, DISPOSABLE MISCELLANEOUS
COMMUNITY
Start: 2021-09-28

## 2021-10-01 RX ORDER — HUMAN INSULIN 100 [IU]/ML
22 INJECTION, SOLUTION SUBCUTANEOUS
Status: ON HOLD | COMMUNITY
Start: 2021-09-15 | End: 2022-04-07 | Stop reason: HOSPADM

## 2021-10-01 RX ORDER — HUMAN INSULIN 100 [IU]/ML
44 INJECTION, SUSPENSION SUBCUTANEOUS
Status: ON HOLD | COMMUNITY
Start: 2021-09-15 | End: 2022-04-07 | Stop reason: HOSPADM

## 2021-10-01 RX ORDER — SYRING-NEEDL,DISP,INSUL,0.3 ML 31GX15/64"
SYRINGE, EMPTY DISPOSABLE MISCELLANEOUS
COMMUNITY
Start: 2021-09-15

## 2021-10-01 SDOH — ECONOMIC STABILITY: TRANSPORTATION INSECURITY
IN THE PAST 12 MONTHS, HAS LACK OF TRANSPORTATION KEPT YOU FROM MEETINGS, WORK, OR FROM GETTING THINGS NEEDED FOR DAILY LIVING?: NO

## 2021-10-01 SDOH — HEALTH STABILITY: PHYSICAL HEALTH: ON AVERAGE, HOW MANY MINUTES DO YOU ENGAGE IN EXERCISE AT THIS LEVEL?: 0 MIN

## 2021-10-01 SDOH — HEALTH STABILITY: MENTAL HEALTH: AUDIT TOTAL SCORE: 0

## 2021-10-01 SDOH — HEALTH STABILITY: MENTAL HEALTH: HOW OFTEN DO YOU HAVE 6 OR MORE DRINKS ON ONE OCCASION?: NEVER

## 2021-10-01 SDOH — HEALTH STABILITY: PHYSICAL HEALTH: ON AVERAGE, HOW MANY DAYS PER WEEK DO YOU ENGAGE IN MODERATE TO STRENUOUS EXERCISE (LIKE A BRISK WALK)?: 0 DAYS

## 2021-10-01 SDOH — HEALTH STABILITY: MENTAL HEALTH: HOW OFTEN DO YOU HAVE A DRINK CONTAINING ALCOHOL?: NEVER

## 2021-10-01 SDOH — ECONOMIC STABILITY: TRANSPORTATION INSECURITY
IN THE PAST 12 MONTHS, HAS THE LACK OF TRANSPORTATION KEPT YOU FROM MEDICAL APPOINTMENTS OR FROM GETTING MEDICATIONS?: NO

## 2021-10-01 SDOH — HEALTH STABILITY: MENTAL HEALTH: HOW MANY STANDARD DRINKS CONTAINING ALCOHOL DO YOU HAVE ON A TYPICAL DAY?: 0,1 OR 2

## 2021-10-01 ASSESSMENT — PAIN SCALES - GENERAL: PAINLEVEL: 0

## 2021-10-08 ENCOUNTER — APPOINTMENT (OUTPATIENT)
Dept: ENDOCRINOLOGY | Age: 36
End: 2021-10-08

## 2021-11-08 ENCOUNTER — WALK IN (OUTPATIENT)
Dept: URGENT CARE | Age: 36
End: 2021-11-08

## 2021-11-08 VITALS
TEMPERATURE: 98.7 F | RESPIRATION RATE: 18 BRPM | WEIGHT: 242 LBS | OXYGEN SATURATION: 99 % | BODY MASS INDEX: 36.68 KG/M2 | DIASTOLIC BLOOD PRESSURE: 75 MMHG | HEART RATE: 93 BPM | HEIGHT: 68 IN | SYSTOLIC BLOOD PRESSURE: 127 MMHG

## 2021-11-08 DIAGNOSIS — H60.311 ACUTE DIFFUSE OTITIS EXTERNA OF RIGHT EAR: Primary | ICD-10-CM

## 2021-11-08 PROCEDURE — 3074F SYST BP LT 130 MM HG: CPT | Performed by: NURSE PRACTITIONER

## 2021-11-08 PROCEDURE — 99214 OFFICE O/P EST MOD 30 MIN: CPT | Performed by: NURSE PRACTITIONER

## 2021-11-08 PROCEDURE — 3078F DIAST BP <80 MM HG: CPT | Performed by: NURSE PRACTITIONER

## 2021-11-08 RX ORDER — ALCOHOL ANTISEPTIC PADS
PADS, MEDICATED (EA) TOPICAL
COMMUNITY
Start: 2021-09-28

## 2021-11-08 RX ORDER — NEOMYCIN SULFATE, POLYMYXIN B SULFATE AND HYDROCORTISONE 10; 3.5; 1 MG/ML; MG/ML; [USP'U]/ML
4 SUSPENSION/ DROPS AURICULAR (OTIC) 4 TIMES DAILY
Qty: 10 ML | Refills: 0 | Status: SHIPPED | OUTPATIENT
Start: 2021-11-08 | End: 2021-11-15

## 2021-12-10 ENCOUNTER — HOSPITAL ENCOUNTER (OUTPATIENT)
Dept: PEDIATRIC CARDIOLOGY | Age: 36
Discharge: HOME | End: 2021-12-10
Attending: PEDIATRICS

## 2021-12-10 ENCOUNTER — OFFICE VISIT (OUTPATIENT)
Dept: PEDIATRIC CARDIOLOGY | Age: 36
End: 2021-12-10
Attending: PEDIATRICS

## 2021-12-10 DIAGNOSIS — E11.65 TYPE 2 DIABETES MELLITUS WITH HYPERGLYCEMIA, WITHOUT LONG-TERM CURRENT USE OF INSULIN (CMD): ICD-10-CM

## 2021-12-10 DIAGNOSIS — Z82.79 FAMILY HISTORY OF CONGENITAL HEART DEFECT: Primary | ICD-10-CM

## 2021-12-10 DIAGNOSIS — Z82.79 FAMILY HISTORY OF CONGENITAL HEART DEFECT: ICD-10-CM

## 2021-12-10 PROCEDURE — 76827 ECHO EXAM OF FETAL HEART: CPT | Performed by: PEDIATRICS

## 2021-12-10 PROCEDURE — 76825 ECHO EXAM OF FETAL HEART: CPT | Performed by: PEDIATRICS

## 2021-12-10 PROCEDURE — 93325 DOPPLER ECHO COLOR FLOW MAPG: CPT

## 2021-12-10 PROCEDURE — 99202 OFFICE O/P NEW SF 15 MIN: CPT | Performed by: PEDIATRICS

## 2021-12-10 PROCEDURE — 93325 DOPPLER ECHO COLOR FLOW MAPG: CPT | Performed by: PEDIATRICS

## 2022-03-02 ENCOUNTER — HOSPITAL ENCOUNTER (OUTPATIENT)
Dept: PEDIATRIC CARDIOLOGY | Age: 37
Discharge: HOME | End: 2022-03-02
Attending: PEDIATRICS

## 2022-03-02 ENCOUNTER — OFFICE VISIT (OUTPATIENT)
Dept: PEDIATRIC CARDIOLOGY | Age: 37
End: 2022-03-02
Attending: PEDIATRICS

## 2022-03-02 DIAGNOSIS — E11.65 TYPE 2 DIABETES MELLITUS WITH HYPERGLYCEMIA, UNSPECIFIED WHETHER LONG TERM INSULIN USE (CMD): ICD-10-CM

## 2022-03-02 DIAGNOSIS — E11.65 TYPE 2 DIABETES MELLITUS WITH HYPERGLYCEMIA, UNSPECIFIED WHETHER LONG TERM INSULIN USE (CMD): Primary | ICD-10-CM

## 2022-03-02 PROCEDURE — 93325 DOPPLER ECHO COLOR FLOW MAPG: CPT

## 2022-03-02 PROCEDURE — 93325 DOPPLER ECHO COLOR FLOW MAPG: CPT | Performed by: PEDIATRICS

## 2022-03-02 PROCEDURE — 76826 ECHO EXAM OF FETAL HEART: CPT | Performed by: PEDIATRICS

## 2022-03-02 PROCEDURE — 99211 OFF/OP EST MAY X REQ PHY/QHP: CPT | Performed by: PEDIATRICS

## 2022-03-02 PROCEDURE — 76828 ECHO EXAM OF FETAL HEART: CPT | Performed by: PEDIATRICS

## 2022-03-14 LAB
GBS EXTERNAL: NEGATIVE
HIV 1+2 AB+HIV1 P24 AG SERPL QL IA: NORMAL
RPR SER QL: NORMAL

## 2022-04-02 ENCOUNTER — LAB SERVICES (OUTPATIENT)
Dept: LAB | Age: 37
End: 2022-04-02

## 2022-04-02 ENCOUNTER — HOSPITAL ENCOUNTER (OUTPATIENT)
Dept: LAB | Age: 37
Discharge: HOME | End: 2022-04-02
Attending: OBSTETRICS & GYNECOLOGY

## 2022-04-02 DIAGNOSIS — Z01.818 PREOP EXAMINATION: Primary | ICD-10-CM

## 2022-04-02 DIAGNOSIS — Z01.812 PRE-PROCEDURAL LABORATORY EXAMINATION: ICD-10-CM

## 2022-04-02 DIAGNOSIS — Z01.812 PRE-PROCEDURAL LABORATORY EXAMINATION: Primary | ICD-10-CM

## 2022-04-02 LAB
ABO + RH BLD: NORMAL
BLD GP AB SCN SERPL QL GEL: NEGATIVE
DEPRECATED RDW RBC: 42.5 FL (ref 39–50)
ERYTHROCYTE [DISTWIDTH] IN BLOOD: 13.2 % (ref 11–15)
FLUAV RNA RESP QL NAA+PROBE: NOT DETECTED
FLUBV RNA RESP QL NAA+PROBE: NOT DETECTED
HCT VFR BLD CALC: 38.8 % (ref 36–46.5)
HGB BLD-MCNC: 13.8 G/DL (ref 12–15.5)
MCH RBC QN AUTO: 31.2 PG (ref 26–34)
MCHC RBC AUTO-ENTMCNC: 35.6 G/DL (ref 32–36.5)
MCV RBC AUTO: 87.8 FL (ref 78–100)
NRBC BLD MANUAL-RTO: 0 /100 WBC
PLATELET # BLD AUTO: 223 K/MCL (ref 140–450)
RBC # BLD: 4.42 MIL/MCL (ref 4–5.2)
SARS-COV-2 RNA RESP QL NAA+PROBE: NOT DETECTED
SERVICE CMNT-IMP: NORMAL
SERVICE CMNT-IMP: NORMAL
TYPE AND SCREEN EXPIRATION DATE: NORMAL
WBC # BLD: 5.2 K/MCL (ref 4.2–11)

## 2022-04-02 PROCEDURE — 86900 BLOOD TYPING SEROLOGIC ABO: CPT | Performed by: OBSTETRICS & GYNECOLOGY

## 2022-04-02 PROCEDURE — 85027 COMPLETE CBC AUTOMATED: CPT | Performed by: OBSTETRICS & GYNECOLOGY

## 2022-04-02 PROCEDURE — 83036 HEMOGLOBIN GLYCOSYLATED A1C: CPT | Performed by: STUDENT IN AN ORGANIZED HEALTH CARE EDUCATION/TRAINING PROGRAM

## 2022-04-02 PROCEDURE — 36415 COLL VENOUS BLD VENIPUNCTURE: CPT | Performed by: OBSTETRICS & GYNECOLOGY

## 2022-04-02 PROCEDURE — U0003 INFECTIOUS AGENT DETECTION BY NUCLEIC ACID (DNA OR RNA); SEVERE ACUTE RESPIRATORY SYNDROME CORONAVIRUS 2 (SARS-COV-2) (CORONAVIRUS DISEASE [COVID-19]), AMPLIFIED PROBE TECHNIQUE, MAKING USE OF HIGH THROUGHPUT TECHNOLOGIES AS DESCRIBED BY CMS-2020-01-R: HCPCS

## 2022-04-04 ENCOUNTER — ANESTHESIA EVENT (OUTPATIENT)
Dept: OBGYN | Age: 37
End: 2022-04-04

## 2022-04-04 ENCOUNTER — HOSPITAL ENCOUNTER (INPATIENT)
Age: 37
LOS: 3 days | Discharge: HOME | End: 2022-04-07
Attending: OBSTETRICS & GYNECOLOGY | Admitting: OBSTETRICS & GYNECOLOGY

## 2022-04-04 ENCOUNTER — ANESTHESIA (OUTPATIENT)
Dept: OBGYN | Age: 37
End: 2022-04-04

## 2022-04-04 DIAGNOSIS — Z98.891 S/P PRIMARY LOW TRANSVERSE C-SECTION: Primary | ICD-10-CM

## 2022-04-04 PROBLEM — Z34.90 TERM PREGNANCY: Status: ACTIVE | Noted: 2022-04-04

## 2022-04-04 PROBLEM — O09.519 ADVANCED MATERNAL AGE, 1ST PREGNANCY: Status: ACTIVE | Noted: 2022-04-04

## 2022-04-04 PROBLEM — O36.60X0 LGA (LARGE FOR GESTATIONAL AGE) FETUS AFFECTING MANAGEMENT OF MOTHER: Status: ACTIVE | Noted: 2022-04-04

## 2022-04-04 LAB
BASE EXCESS / DEFICIT, ARTERIAL CORD BLOOD - RESPIRATORY: -7 MMOL/L
BASE EXCESS / DEFICIT, VENOUS CORD BLOOD - RESPIRATORY: -2 MMOL/L
GLUCOSE BLDC GLUCOMTR-MCNC: 105 MG/DL (ref 70–99)
GLUCOSE BLDC GLUCOMTR-MCNC: 115 MG/DL (ref 70–99)
GLUCOSE BLDC GLUCOMTR-MCNC: 116 MG/DL (ref 70–99)
GLUCOSE BLDC GLUCOMTR-MCNC: 47 MG/DL (ref 70–99)
GLUCOSE BLDC GLUCOMTR-MCNC: 53 MG/DL (ref 70–99)
GLUCOSE BLDC GLUCOMTR-MCNC: 56 MG/DL (ref 70–99)
GLUCOSE BLDC GLUCOMTR-MCNC: 60 MG/DL (ref 70–99)
GLUCOSE BLDC GLUCOMTR-MCNC: 67 MG/DL (ref 70–99)
GLUCOSE BLDC GLUCOMTR-MCNC: 68 MG/DL (ref 70–99)
GLUCOSE BLDC GLUCOMTR-MCNC: 74 MG/DL (ref 70–99)
GLUCOSE BLDC GLUCOMTR-MCNC: 77 MG/DL (ref 70–99)
GLUCOSE BLDC GLUCOMTR-MCNC: 80 MG/DL (ref 70–99)
GLUCOSE BLDC GLUCOMTR-MCNC: 85 MG/DL (ref 70–99)
GLUCOSE BLDC GLUCOMTR-MCNC: 91 MG/DL (ref 70–99)
GLUCOSE BLDC GLUCOMTR-MCNC: 91 MG/DL (ref 70–99)
HBA1C MFR BLD: 6 % (ref 4.5–5.6)
HCO3 BLDCOA-SCNC: 27 MMOL/L (ref 21–28)
HCO3 BLDCOV-SCNC: 29 MMOL/L (ref 22–29)
PCO2 BLDCOA: 107 MM HG (ref 31–74)
PCO2 BLDCOV: 83 MM HG (ref 23–49)
PH BLDCOA: 7.01 UNITS (ref 7.18–7.38)
PH BLDCOV: 7.15 UNITS (ref 7.25–7.45)
PO2 BLDCOA: <20 MM HG (ref 6–31)
PO2 BLDCOV: <20 MM HG (ref 17–41)

## 2022-04-04 PROCEDURE — 82803 BLOOD GASES ANY COMBINATION: CPT

## 2022-04-04 PROCEDURE — 10004651 HB RX, NO CHARGE ITEM: Performed by: STUDENT IN AN ORGANIZED HEALTH CARE EDUCATION/TRAINING PROGRAM

## 2022-04-04 PROCEDURE — 13001455 HB PERINATAL CARE HIGH RISK

## 2022-04-04 PROCEDURE — 10002807 HB RX 258

## 2022-04-04 PROCEDURE — 13000001 HB PHASE II RECOVERY EA 30 MINUTES: Performed by: OBSTETRICS & GYNECOLOGY

## 2022-04-04 PROCEDURE — 10004281 HB COUNTER-STAFF TIME PER 15 MIN

## 2022-04-04 PROCEDURE — 13000036 HB COMPLEX  CASE S/U + 1ST 15 MIN: Performed by: OBSTETRICS & GYNECOLOGY

## 2022-04-04 PROCEDURE — 10002803 HB RX 637: Performed by: STUDENT IN AN ORGANIZED HEALTH CARE EDUCATION/TRAINING PROGRAM

## 2022-04-04 PROCEDURE — 13000012 HB ANESTHESIA SPINAL/EPIDURAL IN L&D: Performed by: OBSTETRICS & GYNECOLOGY

## 2022-04-04 PROCEDURE — 10002801 HB RX 250 W/O HCPCS: Performed by: ANESTHESIOLOGY

## 2022-04-04 PROCEDURE — 10006023 HB SUPPLY 272: Performed by: OBSTETRICS & GYNECOLOGY

## 2022-04-04 PROCEDURE — 99222 1ST HOSP IP/OBS MODERATE 55: CPT | Performed by: STUDENT IN AN ORGANIZED HEALTH CARE EDUCATION/TRAINING PROGRAM

## 2022-04-04 PROCEDURE — 10002800 HB RX 250 W HCPCS: Performed by: STUDENT IN AN ORGANIZED HEALTH CARE EDUCATION/TRAINING PROGRAM

## 2022-04-04 PROCEDURE — 10002801 HB RX 250 W/O HCPCS

## 2022-04-04 PROCEDURE — 13000037 HB COMPLEX CASE EACH ADD MINUTE: Performed by: OBSTETRICS & GYNECOLOGY

## 2022-04-04 PROCEDURE — 88341 IMHCHEM/IMCYTCHM EA ADD ANTB: CPT | Performed by: OBSTETRICS & GYNECOLOGY

## 2022-04-04 PROCEDURE — 10002807 HB RX 258: Performed by: ANESTHESIOLOGY

## 2022-04-04 PROCEDURE — 10002800 HB RX 250 W HCPCS: Performed by: ANESTHESIOLOGY

## 2022-04-04 PROCEDURE — 10000003 HB ROOM CHARGE WOMEN'S HEALTH

## 2022-04-04 PROCEDURE — 10002807 HB RX 258: Performed by: STUDENT IN AN ORGANIZED HEALTH CARE EDUCATION/TRAINING PROGRAM

## 2022-04-04 PROCEDURE — 10002801 HB RX 250 W/O HCPCS: Performed by: OBSTETRICS & GYNECOLOGY

## 2022-04-04 RX ORDER — ACETAMINOPHEN 500 MG
1000 TABLET ORAL EVERY 6 HOURS SCHEDULED
Status: DISCONTINUED | OUTPATIENT
Start: 2022-04-04 | End: 2022-04-07 | Stop reason: HOSPADM

## 2022-04-04 RX ORDER — DEXTROSE MONOHYDRATE 25 G/50ML
INJECTION, SOLUTION INTRAVENOUS
Status: COMPLETED
Start: 2022-04-04 | End: 2022-04-04

## 2022-04-04 RX ORDER — OXYTOCIN-SODIUM CHLORIDE 0.9% IV SOLN 30 UNIT/500ML 30-0.9/5 UT/ML-%
0-334 SOLUTION INTRAVENOUS CONTINUOUS
Status: DISCONTINUED | OUTPATIENT
Start: 2022-04-04 | End: 2022-04-04

## 2022-04-04 RX ORDER — NALBUPHINE HYDROCHLORIDE 10 MG/ML
2.5 INJECTION, SOLUTION INTRAMUSCULAR; INTRAVENOUS; SUBCUTANEOUS
Status: DISCONTINUED | OUTPATIENT
Start: 2022-04-04 | End: 2022-04-04

## 2022-04-04 RX ORDER — BISACODYL 10 MG
10 SUPPOSITORY, RECTAL RECTAL DAILY PRN
Status: DISCONTINUED | OUTPATIENT
Start: 2022-04-04 | End: 2022-04-07 | Stop reason: HOSPADM

## 2022-04-04 RX ORDER — METOCLOPRAMIDE HYDROCHLORIDE 5 MG/ML
5 INJECTION INTRAMUSCULAR; INTRAVENOUS EVERY 6 HOURS PRN
Status: DISCONTINUED | OUTPATIENT
Start: 2022-04-04 | End: 2022-04-04

## 2022-04-04 RX ORDER — EPHEDRINE SULFATE/0.9% NACL/PF 50 MG/10ML
5 SYRINGE (ML) INTRAVENOUS
Status: DISCONTINUED | OUTPATIENT
Start: 2022-04-04 | End: 2022-04-04

## 2022-04-04 RX ORDER — AMOXICILLIN 250 MG
2 CAPSULE ORAL DAILY PRN
Status: DISCONTINUED | OUTPATIENT
Start: 2022-04-04 | End: 2022-04-07 | Stop reason: HOSPADM

## 2022-04-04 RX ORDER — OXYCODONE HYDROCHLORIDE 5 MG/1
5 TABLET ORAL EVERY 4 HOURS PRN
Status: DISCONTINUED | OUTPATIENT
Start: 2022-04-04 | End: 2022-04-07 | Stop reason: HOSPADM

## 2022-04-04 RX ORDER — DEXTROSE MONOHYDRATE 25 G/50ML
12.5 INJECTION, SOLUTION INTRAVENOUS PRN
Status: DISCONTINUED | OUTPATIENT
Start: 2022-04-04 | End: 2022-04-07 | Stop reason: HOSPADM

## 2022-04-04 RX ORDER — PHENYLEPHRINE HYDROCHLORIDE 10 MG/ML
INJECTION, SOLUTION INTRAMUSCULAR; INTRAVENOUS; SUBCUTANEOUS PRN
Status: DISCONTINUED | OUTPATIENT
Start: 2022-04-04 | End: 2022-04-04

## 2022-04-04 RX ORDER — BUPIVACAINE HYDROCHLORIDE 7.5 MG/ML
INJECTION, SOLUTION INTRASPINAL PRN
Status: DISCONTINUED | OUTPATIENT
Start: 2022-04-04 | End: 2022-04-04

## 2022-04-04 RX ORDER — PROCHLORPERAZINE MALEATE 10 MG
5 TABLET ORAL EVERY 4 HOURS PRN
Status: DISCONTINUED | OUTPATIENT
Start: 2022-04-04 | End: 2022-04-07 | Stop reason: HOSPADM

## 2022-04-04 RX ORDER — DEXTROSE MONOHYDRATE 25 G/50ML
25 INJECTION, SOLUTION INTRAVENOUS PRN
Status: COMPLETED | OUTPATIENT
Start: 2022-04-04 | End: 2022-04-04

## 2022-04-04 RX ORDER — SODIUM CHLORIDE, SODIUM LACTATE, POTASSIUM CHLORIDE, CALCIUM CHLORIDE 600; 310; 30; 20 MG/100ML; MG/100ML; MG/100ML; MG/100ML
INJECTION, SOLUTION INTRAVENOUS CONTINUOUS
Status: DISCONTINUED | OUTPATIENT
Start: 2022-04-04 | End: 2022-04-04

## 2022-04-04 RX ORDER — MORPHINE SULFATE 1 MG/ML
INJECTION, SOLUTION EPIDURAL; INTRATHECAL; INTRAVENOUS PRN
Status: DISCONTINUED | OUTPATIENT
Start: 2022-04-04 | End: 2022-04-04

## 2022-04-04 RX ORDER — DEXTROSE MONOHYDRATE 25 G/50ML
25 INJECTION, SOLUTION INTRAVENOUS PRN
Status: DISCONTINUED | OUTPATIENT
Start: 2022-04-04 | End: 2022-04-07 | Stop reason: HOSPADM

## 2022-04-04 RX ORDER — INSULIN ASPART 100 [IU]/ML
16 INJECTION, SOLUTION INTRAVENOUS; SUBCUTANEOUS
Status: ON HOLD | COMMUNITY
End: 2022-04-07 | Stop reason: HOSPADM

## 2022-04-04 RX ORDER — DEXTROSE, SODIUM CHLORIDE, SODIUM LACTATE, POTASSIUM CHLORIDE, AND CALCIUM CHLORIDE 5; .6; .31; .03; .02 G/100ML; G/100ML; G/100ML; G/100ML; G/100ML
INJECTION, SOLUTION INTRAVENOUS CONTINUOUS
Status: ACTIVE | OUTPATIENT
Start: 2022-04-04 | End: 2022-04-04

## 2022-04-04 RX ORDER — PROCHLORPERAZINE EDISYLATE 5 MG/ML
5 INJECTION INTRAMUSCULAR; INTRAVENOUS EVERY 4 HOURS PRN
Status: DISCONTINUED | OUTPATIENT
Start: 2022-04-04 | End: 2022-04-07 | Stop reason: HOSPADM

## 2022-04-04 RX ORDER — HYDRALAZINE HYDROCHLORIDE 20 MG/ML
5 INJECTION INTRAMUSCULAR; INTRAVENOUS EVERY 10 MIN PRN
Status: DISCONTINUED | OUTPATIENT
Start: 2022-04-04 | End: 2022-04-04

## 2022-04-04 RX ORDER — SODIUM CHLORIDE, SODIUM LACTATE, POTASSIUM CHLORIDE, CALCIUM CHLORIDE 600; 310; 30; 20 MG/100ML; MG/100ML; MG/100ML; MG/100ML
INJECTION, SOLUTION INTRAVENOUS CONTINUOUS
Status: DISCONTINUED | OUTPATIENT
Start: 2022-04-04 | End: 2022-04-07

## 2022-04-04 RX ORDER — 0.9 % SODIUM CHLORIDE 0.9 %
2 VIAL (ML) INJECTION EVERY 12 HOURS SCHEDULED
Status: DISCONTINUED | OUTPATIENT
Start: 2022-04-04 | End: 2022-04-04

## 2022-04-04 RX ORDER — IBUPROFEN 800 MG/1
800 TABLET ORAL EVERY 8 HOURS SCHEDULED
Status: DISCONTINUED | OUTPATIENT
Start: 2022-04-04 | End: 2022-04-07 | Stop reason: HOSPADM

## 2022-04-04 RX ORDER — HYDROCORTISONE ACETATE 25 MG/1
25 SUPPOSITORY RECTAL EVERY 8 HOURS PRN
Status: DISCONTINUED | OUTPATIENT
Start: 2022-04-04 | End: 2022-04-07 | Stop reason: HOSPADM

## 2022-04-04 RX ORDER — MAGNESIUM HYDROXIDE 1200 MG/15ML
LIQUID ORAL PRN
Status: DISCONTINUED | OUTPATIENT
Start: 2022-04-04 | End: 2022-04-04 | Stop reason: HOSPADM

## 2022-04-04 RX ORDER — VALACYCLOVIR HYDROCHLORIDE 500 MG/1
500 TABLET, FILM COATED ORAL 2 TIMES DAILY
Status: ON HOLD | COMMUNITY
Start: 2022-03-07 | End: 2022-04-07 | Stop reason: HOSPADM

## 2022-04-04 RX ORDER — DEXTROSE, SODIUM CHLORIDE, SODIUM LACTATE, POTASSIUM CHLORIDE, AND CALCIUM CHLORIDE 5; .6; .31; .03; .02 G/100ML; G/100ML; G/100ML; G/100ML; G/100ML
INJECTION, SOLUTION INTRAVENOUS
Status: COMPLETED
Start: 2022-04-04 | End: 2022-04-04

## 2022-04-04 RX ORDER — DIPHENHYDRAMINE HYDROCHLORIDE 50 MG/ML
25 INJECTION INTRAMUSCULAR; INTRAVENOUS
Status: DISCONTINUED | OUTPATIENT
Start: 2022-04-04 | End: 2022-04-04

## 2022-04-04 RX ORDER — SIMETHICONE 125 MG
125 TABLET,CHEWABLE ORAL 4 TIMES DAILY PRN
Status: DISCONTINUED | OUTPATIENT
Start: 2022-04-04 | End: 2022-04-07 | Stop reason: HOSPADM

## 2022-04-04 RX ORDER — SODIUM CHLORIDE, SODIUM LACTATE, POTASSIUM CHLORIDE, CALCIUM CHLORIDE 600; 310; 30; 20 MG/100ML; MG/100ML; MG/100ML; MG/100ML
INJECTION, SOLUTION INTRAVENOUS
Status: COMPLETED
Start: 2022-04-04 | End: 2022-04-04

## 2022-04-04 RX ORDER — VITAMIN A ACETATE, BETA CAROTENE, ASCORBIC ACID, CHOLECALCIFEROL, .ALPHA.-TOCOPHEROL ACETATE, DL-, THIAMINE MONONITRATE, RIBOFLAVIN, NIACINAMIDE, PYRIDOXINE HYDROCHLORIDE, FOLIC ACID, CYANOCOBALAMIN, CALCIUM CARBONATE, FERROUS FUMARATE, ZINC OXIDE, CUPRIC OXIDE 3080; 12; 120; 400; 1; 1.84; 3; 20; 22; 920; 25; 200; 27; 10; 2 [IU]/1; UG/1; MG/1; [IU]/1; MG/1; MG/1; MG/1; MG/1; MG/1; [IU]/1; MG/1; MG/1; MG/1; MG/1; MG/1
1 TABLET, FILM COATED ORAL DAILY
COMMUNITY

## 2022-04-04 RX ORDER — ONDANSETRON 2 MG/ML
4 INJECTION INTRAMUSCULAR; INTRAVENOUS 2 TIMES DAILY PRN
Status: DISCONTINUED | OUTPATIENT
Start: 2022-04-04 | End: 2022-04-04

## 2022-04-04 RX ORDER — NICOTINE POLACRILEX 4 MG
15 LOZENGE BUCCAL PRN
Status: DISCONTINUED | OUTPATIENT
Start: 2022-04-04 | End: 2022-04-07 | Stop reason: HOSPADM

## 2022-04-04 RX ORDER — NICOTINE POLACRILEX 4 MG
30 LOZENGE BUCCAL PRN
Status: DISCONTINUED | OUTPATIENT
Start: 2022-04-04 | End: 2022-04-07 | Stop reason: HOSPADM

## 2022-04-04 RX ORDER — ONDANSETRON 2 MG/ML
4 INJECTION INTRAMUSCULAR; INTRAVENOUS EVERY 4 HOURS PRN
Status: DISCONTINUED | OUTPATIENT
Start: 2022-04-04 | End: 2022-04-07 | Stop reason: HOSPADM

## 2022-04-04 RX ORDER — OXYTOCIN-SODIUM CHLORIDE 0.9% IV SOLN 30 UNIT/500ML 30-0.9/5 UT/ML-%
0-334 SOLUTION INTRAVENOUS CONTINUOUS
Status: DISCONTINUED | OUTPATIENT
Start: 2022-04-04 | End: 2022-04-07

## 2022-04-04 RX ORDER — SODIUM CHLORIDE, SODIUM LACTATE, POTASSIUM CHLORIDE, CALCIUM CHLORIDE 600; 310; 30; 20 MG/100ML; MG/100ML; MG/100ML; MG/100ML
INJECTION, SOLUTION INTRAVENOUS CONTINUOUS PRN
Status: DISCONTINUED | OUTPATIENT
Start: 2022-04-04 | End: 2022-04-04

## 2022-04-04 RX ORDER — CALCIUM CARBONATE 500 MG/1
500 TABLET, CHEWABLE ORAL EVERY 4 HOURS PRN
Status: DISCONTINUED | OUTPATIENT
Start: 2022-04-04 | End: 2022-04-07 | Stop reason: HOSPADM

## 2022-04-04 RX ORDER — OXYTOCIN 10 [USP'U]/ML
INJECTION, SOLUTION INTRAMUSCULAR; INTRAVENOUS PRN
Status: DISCONTINUED | OUTPATIENT
Start: 2022-04-04 | End: 2022-04-04

## 2022-04-04 RX ORDER — VITAMIN A, VITAMIN C, VITAMIN D-3, VITAMIN E, VITAMIN B-1, VITAMIN B-2, NIACIN, VITAMIN B-6, CALCIUM, IRON, ZINC, COPPER 4000; 120; 400; 22; 1.84; 3; 20; 10; 1; 12; 200; 27; 25; 2 [IU]/1; MG/1; [IU]/1; MG/1; MG/1; MG/1; MG/1; MG/1; MG/1; UG/1; MG/1; MG/1; MG/1; MG/1
1 TABLET ORAL DAILY
Status: DISCONTINUED | OUTPATIENT
Start: 2022-04-04 | End: 2022-04-07 | Stop reason: HOSPADM

## 2022-04-04 RX ORDER — EPHEDRINE SULFATE/0.9% NACL/PF 50 MG/10ML
SYRINGE (ML) INTRAVENOUS PRN
Status: DISCONTINUED | OUTPATIENT
Start: 2022-04-04 | End: 2022-04-04

## 2022-04-04 RX ADMIN — DOCUSATE SODIUM AND SENNOSIDES 2 TABLET: 8.6; 5 TABLET, FILM COATED ORAL at 17:58

## 2022-04-04 RX ADMIN — OXYTOCIN 30 UNITS: 10 INJECTION, SOLUTION INTRAMUSCULAR; INTRAVENOUS at 11:05

## 2022-04-04 RX ADMIN — BUPIVACAINE HYDROCHLORIDE IN DEXTROSE 1.5 ML: 7.5 INJECTION, SOLUTION SUBARACHNOID at 10:46

## 2022-04-04 RX ADMIN — VITAMIN A, VITAMIN C, VITAMIN D, VITAMIN E, THIAMINE, RIBOFLAVIN, NIACIN, VITAMIN B6, FOLIC ACID, VITAMIN B12, CALCIUM, IRON, ZINC, COPPER 1 TABLET: 4000; 120; 400; 22; 1.84; 3; 20; 10; 1; 12; 200; 27; 25; 2 TABLET ORAL at 17:58

## 2022-04-04 RX ADMIN — OXYTOCIN-SODIUM CHLORIDE 0.9% IV SOLN 30 UNIT/500ML 334 ML/HR: 30-0.9/5 SOLUTION at 12:34

## 2022-04-04 RX ADMIN — MORPHINE SULFATE 0.3 MG: 1 INJECTION, SOLUTION EPIDURAL; INTRATHECAL; INTRAVENOUS at 10:46

## 2022-04-04 RX ADMIN — IBUPROFEN 800 MG: 800 TABLET, FILM COATED ORAL at 17:58

## 2022-04-04 RX ADMIN — FENTANYL CITRATE 25 MCG: 50 INJECTION, SOLUTION INTRAMUSCULAR; INTRAVENOUS at 10:46

## 2022-04-04 RX ADMIN — DEXTROSE, SODIUM CHLORIDE, SODIUM LACTATE, POTASSIUM CHLORIDE, AND CALCIUM CHLORIDE: 5; .6; .31; .03; .02 INJECTION, SOLUTION INTRAVENOUS at 15:35

## 2022-04-04 RX ADMIN — SODIUM CHLORIDE, POTASSIUM CHLORIDE, SODIUM LACTATE AND CALCIUM CHLORIDE: 600; 310; 30; 20 INJECTION, SOLUTION INTRAVENOUS at 10:37

## 2022-04-04 RX ADMIN — DEXTROSE MONOHYDRATE 12.5 G: 25 INJECTION, SOLUTION INTRAVENOUS at 12:27

## 2022-04-04 RX ADMIN — SODIUM CHLORIDE, POTASSIUM CHLORIDE, SODIUM LACTATE AND CALCIUM CHLORIDE: 600; 310; 30; 20 INJECTION, SOLUTION INTRAVENOUS at 11:05

## 2022-04-04 RX ADMIN — EPHEDRINE SULFATE 10 MG: 5 INJECTION INTRAVENOUS at 10:58

## 2022-04-04 RX ADMIN — DEXTROSE MONOHYDRATE 12.5 G: 25 INJECTION, SOLUTION INTRAVENOUS at 12:45

## 2022-04-04 RX ADMIN — CEFAZOLIN SODIUM 2000 MG: 300 INJECTION, POWDER, LYOPHILIZED, FOR SOLUTION INTRAVENOUS at 18:36

## 2022-04-04 RX ADMIN — SODIUM CHLORIDE, SODIUM LACTATE, POTASSIUM CHLORIDE, CALCIUM CHLORIDE: 600; 310; 30; 20 INJECTION, SOLUTION INTRAVENOUS at 09:30

## 2022-04-04 RX ADMIN — FENTANYL CITRATE 25 MCG: 50 INJECTION INTRAMUSCULAR; INTRAVENOUS at 15:18

## 2022-04-04 RX ADMIN — ACETAMINOPHEN 1000 MG: 500 TABLET ORAL at 17:58

## 2022-04-04 RX ADMIN — SODIUM CHLORIDE, POTASSIUM CHLORIDE, SODIUM LACTATE AND CALCIUM CHLORIDE: 600; 310; 30; 20 INJECTION, SOLUTION INTRAVENOUS at 09:30

## 2022-04-04 RX ADMIN — PHENYLEPHRINE HYDROCHLORIDE 100 MCG: 10 INJECTION INTRAVENOUS at 10:56

## 2022-04-04 RX ADMIN — FENTANYL CITRATE 25 MCG: 50 INJECTION INTRAMUSCULAR; INTRAVENOUS at 15:26

## 2022-04-04 RX ADMIN — PHENYLEPHRINE HYDROCHLORIDE 100 MCG: 10 INJECTION INTRAVENOUS at 11:05

## 2022-04-04 RX ADMIN — CEFAZOLIN SODIUM 2000 MG: 300 INJECTION, POWDER, LYOPHILIZED, FOR SOLUTION INTRAVENOUS at 10:54

## 2022-04-04 RX ADMIN — PHENYLEPHRINE HYDROCHLORIDE 100 MCG: 10 INJECTION INTRAVENOUS at 10:58

## 2022-04-04 RX ADMIN — PHENYLEPHRINE HYDROCHLORIDE 100 MCG: 10 INJECTION INTRAVENOUS at 11:13

## 2022-04-04 RX ADMIN — SODIUM CHLORIDE, POTASSIUM CHLORIDE, SODIUM LACTATE AND CALCIUM CHLORIDE: 600; 310; 30; 20 INJECTION, SOLUTION INTRAVENOUS at 18:00

## 2022-04-04 RX ADMIN — OXYTOCIN 10 UNITS: 10 INJECTION, SOLUTION INTRAMUSCULAR; INTRAVENOUS at 11:28

## 2022-04-04 ASSESSMENT — COGNITIVE AND FUNCTIONAL STATUS - GENERAL
DO YOU HAVE SERIOUS DIFFICULTY WALKING OR CLIMBING STAIRS: NO
BECAUSE OF A PHYSICAL, MENTAL, OR EMOTIONAL CONDITION, DO YOU HAVE SERIOUS DIFFICULTY CONCENTRATING, REMEMBERING OR MAKING DECISIONS: NO
DO YOU HAVE DIFFICULTY DRESSING OR BATHING: NO
BECAUSE OF A PHYSICAL, MENTAL, OR EMOTIONAL CONDITION, DO YOU HAVE DIFFICULTY DOING ERRANDS ALONE: NO

## 2022-04-04 ASSESSMENT — LIFESTYLE VARIABLES
LAST DRINK YOU HAD: DENIES INTAKE
CHRONIC/CANCER PAIN PRESENT: NO
HOW OFTEN DO YOU HAVE 6 OR MORE DRINKS ON ONE OCCASION: NEVER
HOW MANY STANDARD DRINKS CONTAINING ALCOHOL DO YOU HAVE ON A TYPICAL DAY: 0,1 OR 2
HOW OFTEN DO YOU HAVE A DRINK CONTAINING ALCOHOL: NEVER
RECENTLY LOST WEIGHT WITHOUT TRYING: 0
ADL NEEDS ASSIST: NO
HAVE YOU BEEN EATING POORLY BECAUSE OF A DECREASED APPETITE: 0
AUDIT-C TOTAL SCORE: 0
SHORT OF BREATH OR FATIGUE WITH ADLS: NO
ALCOHOL_USE_STATUS: NO OR LOW RISK WITH VALIDATED TOOL
ARE YOU BLIND OR DO YOU HAVE SERIOUS DIFFICULTY SEEING, EVEN WHEN WEARING GLASSES: NO
ADL BEFORE ADMISSION: INDEPENDENT
IS PATIENT ABLE TO COMPLETE ASSESSMENT AT THIS TIME: YES
HISTORY OF PROBLEMS WHEN YOU STOP DRINKING ALCOHOL: NO
RECENT DECLINE IN ADLS: NO
ARE YOU DEAF OR DO YOU HAVE SERIOUS DIFFICULTY  HEARING: NO

## 2022-04-04 ASSESSMENT — PAIN SCALES - GENERAL
PAINLEVEL_OUTOF10: 6
PAINLEVEL_OUTOF10: 7
PAINLEVEL_OUTOF10: 7
PAINLEVEL_OUTOF10: 0
PAINLEVEL_OUTOF10: 8
PAINLEVEL_OUTOF10: 8
PAINLEVEL_OUTOF10: 0
PAINLEVEL_OUTOF10: 0
PAINLEVEL_OUTOF10: 5
PAINLEVEL_OUTOF10: 5

## 2022-04-04 ASSESSMENT — ACTIVITIES OF DAILY LIVING (ADL): ADL_SCORE: 12

## 2022-04-04 ASSESSMENT — ENCOUNTER SYMPTOMS: HEADACHES: 1

## 2022-04-05 LAB
CORTIS SERPL-MCNC: 26.5 MCG/DL (ref 3.4–22.5)
DEPRECATED RDW RBC: 43.8 FL (ref 39–50)
ERYTHROCYTE [DISTWIDTH] IN BLOOD: 13.3 % (ref 11–15)
GLUCOSE BLDC GLUCOMTR-MCNC: 119 MG/DL (ref 70–99)
GLUCOSE BLDC GLUCOMTR-MCNC: 134 MG/DL (ref 70–99)
GLUCOSE BLDC GLUCOMTR-MCNC: 151 MG/DL (ref 70–99)
GLUCOSE BLDC GLUCOMTR-MCNC: 153 MG/DL (ref 70–99)
GLUCOSE BLDC GLUCOMTR-MCNC: 64 MG/DL (ref 70–99)
GLUCOSE BLDC GLUCOMTR-MCNC: 66 MG/DL (ref 70–99)
GLUCOSE BLDC GLUCOMTR-MCNC: 83 MG/DL (ref 70–99)
GLUCOSE BLDC GLUCOMTR-MCNC: 89 MG/DL (ref 70–99)
HCT VFR BLD CALC: 34.3 % (ref 36–46.5)
HGB BLD-MCNC: 11.8 G/DL (ref 12–15.5)
MCH RBC QN AUTO: 31.1 PG (ref 26–34)
MCHC RBC AUTO-ENTMCNC: 34.4 G/DL (ref 32–36.5)
MCV RBC AUTO: 90.5 FL (ref 78–100)
NRBC BLD MANUAL-RTO: 0 /100 WBC
PLATELET # BLD AUTO: 204 K/MCL (ref 140–450)
RBC # BLD: 3.79 MIL/MCL (ref 4–5.2)
WBC # BLD: 9.4 K/MCL (ref 4.2–11)

## 2022-04-05 PROCEDURE — 10002807 HB RX 258: Performed by: STUDENT IN AN ORGANIZED HEALTH CARE EDUCATION/TRAINING PROGRAM

## 2022-04-05 PROCEDURE — 99232 SBSQ HOSP IP/OBS MODERATE 35: CPT | Performed by: STUDENT IN AN ORGANIZED HEALTH CARE EDUCATION/TRAINING PROGRAM

## 2022-04-05 PROCEDURE — 82533 TOTAL CORTISOL: CPT | Performed by: INTERNAL MEDICINE

## 2022-04-05 PROCEDURE — 10004651 HB RX, NO CHARGE ITEM: Performed by: STUDENT IN AN ORGANIZED HEALTH CARE EDUCATION/TRAINING PROGRAM

## 2022-04-05 PROCEDURE — 10002803 HB RX 637: Performed by: STUDENT IN AN ORGANIZED HEALTH CARE EDUCATION/TRAINING PROGRAM

## 2022-04-05 PROCEDURE — 10000003 HB ROOM CHARGE WOMEN'S HEALTH

## 2022-04-05 PROCEDURE — 10002800 HB RX 250 W HCPCS: Performed by: STUDENT IN AN ORGANIZED HEALTH CARE EDUCATION/TRAINING PROGRAM

## 2022-04-05 PROCEDURE — 85027 COMPLETE CBC AUTOMATED: CPT | Performed by: STUDENT IN AN ORGANIZED HEALTH CARE EDUCATION/TRAINING PROGRAM

## 2022-04-05 RX ORDER — ENOXAPARIN SODIUM 100 MG/ML
40 INJECTION SUBCUTANEOUS EVERY 24 HOURS
Status: DISCONTINUED | OUTPATIENT
Start: 2022-04-05 | End: 2022-04-07 | Stop reason: HOSPADM

## 2022-04-05 RX ADMIN — IBUPROFEN 800 MG: 800 TABLET, FILM COATED ORAL at 01:21

## 2022-04-05 RX ADMIN — SODIUM CHLORIDE, POTASSIUM CHLORIDE, SODIUM LACTATE AND CALCIUM CHLORIDE: 600; 310; 30; 20 INJECTION, SOLUTION INTRAVENOUS at 01:21

## 2022-04-05 RX ADMIN — CEFAZOLIN SODIUM 2000 MG: 300 INJECTION, POWDER, LYOPHILIZED, FOR SOLUTION INTRAVENOUS at 13:47

## 2022-04-05 RX ADMIN — IBUPROFEN 800 MG: 800 TABLET, FILM COATED ORAL at 09:23

## 2022-04-05 RX ADMIN — ACETAMINOPHEN 1000 MG: 500 TABLET ORAL at 09:23

## 2022-04-05 RX ADMIN — CEFAZOLIN SODIUM 2000 MG: 300 INJECTION, POWDER, LYOPHILIZED, FOR SOLUTION INTRAVENOUS at 03:40

## 2022-04-05 RX ADMIN — DOCUSATE SODIUM AND SENNOSIDES 2 TABLET: 8.6; 5 TABLET, FILM COATED ORAL at 20:58

## 2022-04-05 RX ADMIN — ACETAMINOPHEN 1000 MG: 500 TABLET ORAL at 01:21

## 2022-04-05 RX ADMIN — IBUPROFEN 800 MG: 800 TABLET, FILM COATED ORAL at 18:23

## 2022-04-05 RX ADMIN — SIMETHICONE 125 MG: 125 TABLET, CHEWABLE ORAL at 16:25

## 2022-04-05 RX ADMIN — MAGNESIUM HYDROXIDE 30 ML: 400 SUSPENSION ORAL at 20:57

## 2022-04-05 RX ADMIN — ENOXAPARIN SODIUM 40 MG: 40 INJECTION SUBCUTANEOUS at 13:44

## 2022-04-05 RX ADMIN — VITAMIN A, VITAMIN C, VITAMIN D, VITAMIN E, THIAMINE, RIBOFLAVIN, NIACIN, VITAMIN B6, FOLIC ACID, VITAMIN B12, CALCIUM, IRON, ZINC, COPPER 1 TABLET: 4000; 120; 400; 22; 1.84; 3; 20; 10; 1; 12; 200; 27; 25; 2 TABLET ORAL at 09:23

## 2022-04-05 RX ADMIN — ACETAMINOPHEN 1000 MG: 500 TABLET ORAL at 16:20

## 2022-04-05 ASSESSMENT — PAIN SCALES - GENERAL
PAINLEVEL_OUTOF10: 5
PAINLEVEL_OUTOF10: 2
PAINLEVEL_OUTOF10: 4

## 2022-04-06 LAB
GLUCOSE BLDC GLUCOMTR-MCNC: 104 MG/DL (ref 70–99)
GLUCOSE BLDC GLUCOMTR-MCNC: 130 MG/DL (ref 70–99)
GLUCOSE BLDC GLUCOMTR-MCNC: 177 MG/DL (ref 70–99)
GLUCOSE BLDC GLUCOMTR-MCNC: 212 MG/DL (ref 70–99)

## 2022-04-06 PROCEDURE — 10000003 HB ROOM CHARGE WOMEN'S HEALTH

## 2022-04-06 PROCEDURE — 10002800 HB RX 250 W HCPCS: Performed by: STUDENT IN AN ORGANIZED HEALTH CARE EDUCATION/TRAINING PROGRAM

## 2022-04-06 PROCEDURE — 10004651 HB RX, NO CHARGE ITEM: Performed by: STUDENT IN AN ORGANIZED HEALTH CARE EDUCATION/TRAINING PROGRAM

## 2022-04-06 PROCEDURE — 99232 SBSQ HOSP IP/OBS MODERATE 35: CPT | Performed by: STUDENT IN AN ORGANIZED HEALTH CARE EDUCATION/TRAINING PROGRAM

## 2022-04-06 PROCEDURE — 10002803 HB RX 637: Performed by: STUDENT IN AN ORGANIZED HEALTH CARE EDUCATION/TRAINING PROGRAM

## 2022-04-06 RX ADMIN — IBUPROFEN 800 MG: 800 TABLET, FILM COATED ORAL at 17:20

## 2022-04-06 RX ADMIN — ENOXAPARIN SODIUM 40 MG: 40 INJECTION SUBCUTANEOUS at 11:29

## 2022-04-06 RX ADMIN — IBUPROFEN 800 MG: 800 TABLET, FILM COATED ORAL at 00:40

## 2022-04-06 RX ADMIN — BISACODYL 10 MG: 10 SUPPOSITORY RECTAL at 11:29

## 2022-04-06 RX ADMIN — IBUPROFEN 800 MG: 800 TABLET, FILM COATED ORAL at 08:52

## 2022-04-06 RX ADMIN — INSULIN LISPRO 1 UNITS: 100 INJECTION, SOLUTION INTRAVENOUS; SUBCUTANEOUS at 11:29

## 2022-04-06 RX ADMIN — ACETAMINOPHEN 1000 MG: 500 TABLET ORAL at 08:52

## 2022-04-06 RX ADMIN — VITAMIN A, VITAMIN C, VITAMIN D, VITAMIN E, THIAMINE, RIBOFLAVIN, NIACIN, VITAMIN B6, FOLIC ACID, VITAMIN B12, CALCIUM, IRON, ZINC, COPPER 1 TABLET: 4000; 120; 400; 22; 1.84; 3; 20; 10; 1; 12; 200; 27; 25; 2 TABLET ORAL at 08:52

## 2022-04-06 RX ADMIN — ACETAMINOPHEN 1000 MG: 500 TABLET ORAL at 00:37

## 2022-04-06 RX ADMIN — ACETAMINOPHEN 1000 MG: 500 TABLET ORAL at 17:20

## 2022-04-06 ASSESSMENT — PAIN SCALES - GENERAL
PAINLEVEL_OUTOF10: 7
PAINLEVEL_OUTOF10: 2
PAINLEVEL_OUTOF10: 3
PAINLEVEL_OUTOF10: 7
PAINLEVEL_OUTOF10: 7

## 2022-04-07 VITALS
HEIGHT: 68 IN | TEMPERATURE: 97.9 F | WEIGHT: 256 LBS | DIASTOLIC BLOOD PRESSURE: 75 MMHG | SYSTOLIC BLOOD PRESSURE: 118 MMHG | BODY MASS INDEX: 38.8 KG/M2 | HEART RATE: 69 BPM | RESPIRATION RATE: 14 BRPM | OXYGEN SATURATION: 98 %

## 2022-04-07 PROBLEM — Z34.90 TERM PREGNANCY: Status: RESOLVED | Noted: 2022-04-04 | Resolved: 2022-04-07

## 2022-04-07 PROBLEM — O09.519 ADVANCED MATERNAL AGE, 1ST PREGNANCY: Status: RESOLVED | Noted: 2022-04-04 | Resolved: 2022-04-07

## 2022-04-07 PROBLEM — O36.60X0 LGA (LARGE FOR GESTATIONAL AGE) FETUS AFFECTING MANAGEMENT OF MOTHER: Status: RESOLVED | Noted: 2022-04-04 | Resolved: 2022-04-07

## 2022-04-07 LAB
GLUCOSE BLDC GLUCOMTR-MCNC: 105 MG/DL (ref 70–99)
GLUCOSE BLDC GLUCOMTR-MCNC: 135 MG/DL (ref 70–99)

## 2022-04-07 PROCEDURE — 10004651 HB RX, NO CHARGE ITEM: Performed by: STUDENT IN AN ORGANIZED HEALTH CARE EDUCATION/TRAINING PROGRAM

## 2022-04-07 PROCEDURE — 10002803 HB RX 637: Performed by: STUDENT IN AN ORGANIZED HEALTH CARE EDUCATION/TRAINING PROGRAM

## 2022-04-07 PROCEDURE — 10002800 HB RX 250 W HCPCS: Performed by: STUDENT IN AN ORGANIZED HEALTH CARE EDUCATION/TRAINING PROGRAM

## 2022-04-07 RX ORDER — OXYCODONE HYDROCHLORIDE 5 MG/1
5 TABLET ORAL EVERY 4 HOURS PRN
Qty: 14 TABLET | Refills: 0 | Status: SHIPPED | OUTPATIENT
Start: 2022-04-07

## 2022-04-07 RX ORDER — DOCUSATE SODIUM 100 MG/1
100 CAPSULE, LIQUID FILLED ORAL 2 TIMES DAILY PRN
Qty: 30 CAPSULE | Refills: 0 | Status: SHIPPED | OUTPATIENT
Start: 2022-04-07

## 2022-04-07 RX ORDER — ACETAMINOPHEN 500 MG
1000 TABLET ORAL EVERY 6 HOURS SCHEDULED
Qty: 30 TABLET | Refills: 0 | Status: SHIPPED | OUTPATIENT
Start: 2022-04-07

## 2022-04-07 RX ORDER — IBUPROFEN 800 MG/1
800 TABLET ORAL EVERY 8 HOURS PRN
Qty: 30 TABLET | Refills: 0 | Status: SHIPPED | OUTPATIENT
Start: 2022-04-07

## 2022-04-07 RX ORDER — HUMAN INSULIN 100 [IU]/ML
INJECTION, SOLUTION SUBCUTANEOUS
Qty: 10 ML | Refills: 12 | Status: SHIPPED
Start: 2022-04-07 | End: 2022-12-13 | Stop reason: SDUPTHER

## 2022-04-07 RX ADMIN — ENOXAPARIN SODIUM 40 MG: 40 INJECTION SUBCUTANEOUS at 09:17

## 2022-04-07 RX ADMIN — IBUPROFEN 800 MG: 800 TABLET, FILM COATED ORAL at 09:15

## 2022-04-07 RX ADMIN — ACETAMINOPHEN 1000 MG: 500 TABLET ORAL at 09:16

## 2022-04-07 RX ADMIN — IBUPROFEN 800 MG: 800 TABLET, FILM COATED ORAL at 02:08

## 2022-04-07 RX ADMIN — VITAMIN A, VITAMIN C, VITAMIN D, VITAMIN E, THIAMINE, RIBOFLAVIN, NIACIN, VITAMIN B6, FOLIC ACID, VITAMIN B12, CALCIUM, IRON, ZINC, COPPER 1 TABLET: 4000; 120; 400; 22; 1.84; 3; 20; 10; 1; 12; 200; 27; 25; 2 TABLET ORAL at 09:16

## 2022-04-07 RX ADMIN — ACETAMINOPHEN 1000 MG: 500 TABLET ORAL at 02:08

## 2022-04-07 RX ADMIN — ACETAMINOPHEN 1000 MG: 500 TABLET ORAL at 14:47

## 2022-04-07 ASSESSMENT — PAIN SCALES - GENERAL
PAINLEVEL_OUTOF10: 4
PAINLEVEL_OUTOF10: 5
PAINLEVEL_OUTOF10: 5

## 2022-04-11 LAB
ASR DISCLAIMER: NORMAL
CASE RPRT: NORMAL
CLINICAL INFO: NORMAL
PATH REPORT.ADDENDUM SPEC: NORMAL
PATH REPORT.FINAL DX SPEC: NORMAL
PATH REPORT.FINAL DX SPEC: NORMAL
PATH REPORT.GROSS SPEC: NORMAL

## 2022-08-07 ENCOUNTER — NURSE TRIAGE (OUTPATIENT)
Dept: TELEHEALTH | Age: 37
End: 2022-08-07

## 2022-08-07 ENCOUNTER — WALK IN (OUTPATIENT)
Dept: URGENT CARE | Age: 37
End: 2022-08-07

## 2022-08-07 ENCOUNTER — E-VISIT (OUTPATIENT)
Dept: OTHER | Age: 37
End: 2022-08-07

## 2022-08-07 VITALS
SYSTOLIC BLOOD PRESSURE: 130 MMHG | WEIGHT: 235 LBS | TEMPERATURE: 97.8 F | RESPIRATION RATE: 14 BRPM | BODY MASS INDEX: 35.61 KG/M2 | OXYGEN SATURATION: 96 % | DIASTOLIC BLOOD PRESSURE: 78 MMHG | HEIGHT: 68 IN | HEART RATE: 73 BPM

## 2022-08-07 DIAGNOSIS — R69 DIAGNOSIS DEFERRED: Primary | ICD-10-CM

## 2022-08-07 DIAGNOSIS — R21 RASH: Primary | ICD-10-CM

## 2022-08-07 PROCEDURE — 99213 OFFICE O/P EST LOW 20 MIN: CPT | Performed by: NURSE PRACTITIONER

## 2022-08-07 PROCEDURE — 3075F SYST BP GE 130 - 139MM HG: CPT | Performed by: NURSE PRACTITIONER

## 2022-08-07 PROCEDURE — 3078F DIAST BP <80 MM HG: CPT | Performed by: NURSE PRACTITIONER

## 2022-08-07 ASSESSMENT — ENCOUNTER SYMPTOMS
GASTROINTESTINAL NEGATIVE: 1
CONSTITUTIONAL NEGATIVE: 1
NEUROLOGICAL NEGATIVE: 1
EYES NEGATIVE: 1
RESPIRATORY NEGATIVE: 1
PSYCHIATRIC NEGATIVE: 1

## 2022-08-29 ENCOUNTER — APPOINTMENT (OUTPATIENT)
Dept: URBAN - METROPOLITAN AREA CLINIC 317 | Age: 37
Setting detail: DERMATOLOGY
End: 2022-08-29

## 2022-08-29 DIAGNOSIS — B36.0 PITYRIASIS VERSICOLOR: ICD-10-CM

## 2022-08-29 PROCEDURE — OTHER PRESCRIPTION: OTHER

## 2022-08-29 PROCEDURE — 99204 OFFICE O/P NEW MOD 45 MIN: CPT

## 2022-08-29 PROCEDURE — OTHER COUNSELING: OTHER

## 2022-08-29 RX ORDER — KETOCONAZOLE 20 MG/G
CREAM TOPICAL BID
Qty: 60 | Refills: 3 | Status: ERX | COMMUNITY
Start: 2022-08-29

## 2022-08-29 RX ORDER — KETOCONAZOLE 20 MG/ML
SHAMPOO, SUSPENSION TOPICAL TIW
Qty: 120 | Refills: 4 | Status: ERX | COMMUNITY
Start: 2022-08-29

## 2022-08-29 ASSESSMENT — LOCATION DETAILED DESCRIPTION DERM: LOCATION DETAILED: LEFT LATERAL UPPER BACK

## 2022-08-29 ASSESSMENT — LOCATION ZONE DERM: LOCATION ZONE: TRUNK

## 2022-08-29 ASSESSMENT — LOCATION SIMPLE DESCRIPTION DERM: LOCATION SIMPLE: LEFT UPPER BACK

## 2022-12-13 ENCOUNTER — LAB SERVICES (OUTPATIENT)
Dept: LAB | Age: 37
End: 2022-12-13

## 2022-12-13 ENCOUNTER — OFFICE VISIT (OUTPATIENT)
Dept: FAMILY MEDICINE | Age: 37
End: 2022-12-13

## 2022-12-13 VITALS
OXYGEN SATURATION: 99 % | HEIGHT: 68 IN | SYSTOLIC BLOOD PRESSURE: 123 MMHG | WEIGHT: 229.39 LBS | TEMPERATURE: 98 F | RESPIRATION RATE: 18 BRPM | HEART RATE: 78 BPM | DIASTOLIC BLOOD PRESSURE: 71 MMHG | BODY MASS INDEX: 34.77 KG/M2

## 2022-12-13 DIAGNOSIS — E11.65 TYPE 2 DIABETES MELLITUS WITH HYPERGLYCEMIA, WITHOUT LONG-TERM CURRENT USE OF INSULIN (CMD): ICD-10-CM

## 2022-12-13 DIAGNOSIS — S63.613A SPRAIN OF LEFT MIDDLE FINGER, UNSPECIFIED SITE OF DIGIT, INITIAL ENCOUNTER: Primary | ICD-10-CM

## 2022-12-13 PROCEDURE — 99214 OFFICE O/P EST MOD 30 MIN: CPT | Performed by: FAMILY MEDICINE

## 2022-12-13 PROCEDURE — 82570 ASSAY OF URINE CREATININE: CPT | Performed by: INTERNAL MEDICINE

## 2022-12-13 PROCEDURE — 82043 UR ALBUMIN QUANTITATIVE: CPT | Performed by: INTERNAL MEDICINE

## 2022-12-13 PROCEDURE — 83036 HEMOGLOBIN GLYCOSYLATED A1C: CPT | Performed by: INTERNAL MEDICINE

## 2022-12-13 PROCEDURE — 3078F DIAST BP <80 MM HG: CPT | Performed by: FAMILY MEDICINE

## 2022-12-13 PROCEDURE — 36415 COLL VENOUS BLD VENIPUNCTURE: CPT | Performed by: INTERNAL MEDICINE

## 2022-12-13 PROCEDURE — 3074F SYST BP LT 130 MM HG: CPT | Performed by: FAMILY MEDICINE

## 2022-12-13 RX ORDER — LANCING DEVICE
EACH MISCELLANEOUS
Qty: 1 KIT | Refills: 0 | Status: SHIPPED | OUTPATIENT
Start: 2022-12-13

## 2022-12-13 RX ORDER — HUMAN INSULIN 100 [IU]/ML
INJECTION, SOLUTION SUBCUTANEOUS
Qty: 10 ML | Refills: 12 | Status: SHIPPED | OUTPATIENT
Start: 2022-12-13

## 2022-12-13 RX ORDER — MELOXICAM 15 MG/1
15 TABLET ORAL DAILY
Qty: 15 TABLET | Refills: 1 | Status: SHIPPED | OUTPATIENT
Start: 2022-12-13

## 2022-12-13 ASSESSMENT — ENCOUNTER SYMPTOMS
GASTROINTESTINAL NEGATIVE: 1
ALLERGIC/IMMUNOLOGIC NEGATIVE: 1
EYES NEGATIVE: 1
PSYCHIATRIC NEGATIVE: 1
CONSTITUTIONAL NEGATIVE: 1
RESPIRATORY NEGATIVE: 1
HEMATOLOGIC/LYMPHATIC NEGATIVE: 1
ENDOCRINE NEGATIVE: 1
NEUROLOGICAL NEGATIVE: 1

## 2022-12-13 ASSESSMENT — PATIENT HEALTH QUESTIONNAIRE - PHQ9
SUM OF ALL RESPONSES TO PHQ9 QUESTIONS 1 AND 2: 0
1. LITTLE INTEREST OR PLEASURE IN DOING THINGS: NOT AT ALL
2. FEELING DOWN, DEPRESSED OR HOPELESS: NOT AT ALL
CLINICAL INTERPRETATION OF PHQ2 SCORE: NO FURTHER SCREENING NEEDED
SUM OF ALL RESPONSES TO PHQ9 QUESTIONS 1 AND 2: 0

## 2022-12-13 ASSESSMENT — PAIN SCALES - GENERAL: PAINLEVEL: 0

## 2022-12-14 LAB
CREAT UR-MCNC: 193 MG/DL
HBA1C MFR BLD: 11.4 % (ref 4.5–5.6)
MICROALBUMIN UR-MCNC: 3.41 MG/DL
MICROALBUMIN/CREAT UR: 17.7 MG/G

## 2022-12-15 ENCOUNTER — TELEPHONE (OUTPATIENT)
Dept: FAMILY MEDICINE | Age: 37
End: 2022-12-15

## 2023-04-05 DIAGNOSIS — E11.65 TYPE 2 DIABETES MELLITUS WITH HYPERGLYCEMIA, WITHOUT LONG-TERM CURRENT USE OF INSULIN (CMD): Primary | ICD-10-CM

## 2023-05-27 ENCOUNTER — EXTERNAL LAB (OUTPATIENT)
Dept: OTHER | Age: 38
End: 2023-05-27

## 2023-05-27 LAB
ALBUMIN SERPL-MCNC: 4.6 G/DL (ref 3.6–5.1)
ALBUMIN/GLOB SERPL: 1.5 (CALC) (ref 1–2.5)
ALP SERPL-CCNC: 77 U/L (ref 31–125)
ALT SERPL-CCNC: 30 U/L (ref 6–29)
AST SERPL-CCNC: 23 U/L (ref 10–30)
BILIRUB CONJ SERPL-MCNC: 0.1 MG/DL
BILIRUB SERPL-MCNC: 0.6 MG/DL (ref 0.2–1.2)
BUN SERPL-MCNC: 11 MG/DL (ref 7–25)
CALCIUM SERPL-MCNC: 10.1 MG/DL (ref 8.6–10.2)
CHLORIDE SERPL-SCNC: 96 MMOL/L (ref 98–110)
CHOLEST SERPL-MCNC: 207 MG/DL (ref 125–199)
CHOLEST/HDLC SERPL: 4.1 (CALC)
CREAT SERPL-MCNC: 0.63 MG/DL (ref 0.5–0.97)
ERYTHROCYTE [DISTWIDTH] IN BLOOD: 12.4 % (ref 11–15)
GFR SERPLBLD SCHWARTZ-ARVRAT: 116 ML/MIN/1.73M2
GGT SERPL-CCNC: 31 U/L (ref 3–50)
GLOBULIN SER-MCNC: 3.1 G/DL (CALC) (ref 1.9–3.7)
GLUCOSE SERPL-MCNC: 324 MG/DL (ref 65–99)
HBA1C MFR BLD: 11.4 % OF TOTAL HGB (ref 4–5.6)
HCT VFR BLD CALC: 44.1 % (ref 35–45)
HDLC SERPL-MCNC: 50 MG/DL
HGB BLD-MCNC: 14.8 G/DL (ref 11.7–15.5)
IRON SERPL-MCNC: 124 MCG/DL (ref 40–190)
LDH SERPL L TO P-CCNC: 120 U/L (ref 100–200)
LDLC SERPL CALC-MCNC: 127 MG/DL (CALC)
LENGTH OF FAST TIME PATIENT: YES H
LENGTH OF FAST TIME PATIENT: YES H
MCH RBC QN AUTO: 30.6 PG (ref 27–33)
MCHC RBC AUTO-ENTMCNC: 33.6 G/DL (ref 32–36)
MCV RBC AUTO: 91.1 FL (ref 80–100)
MPV (OFFPRE2): 9 FL (ref 7.5–12.5)
NONHDLC SERPL-MCNC: 157 MG/DL
PHOSPHATE SERPL-MCNC: 3.6 MG/DL (ref 2.5–4.5)
PLATELET # BLD: 254 THOUSAND/UL (ref 140–400)
POTASSIUM SERPL-SCNC: 4.3 MMOL/L (ref 3.5–5.3)
PROT SERPL-MCNC: 7.7 G/DL (ref 6.1–8.1)
RBC # BLD: 4.84 MILLION/UL (ref 3.8–5.1)
SODIUM SERPL-SCNC: 135 MMOL/L (ref 135–146)
TRIGL SERPL-MCNC: 180 MG/DL
URATE SERPL-MCNC: 4 MG/DL (ref 2.5–7)
VLDLC SERPL CALC-MCNC: 36 MG/DL (CALC)
WBC # BLD: 4.3 THOUSAND/UL (ref 3.8–10.8)

## 2023-05-30 ENCOUNTER — TELEPHONE (OUTPATIENT)
Dept: FAMILY MEDICINE | Age: 38
End: 2023-05-30

## 2023-06-08 ENCOUNTER — TELEPHONE (OUTPATIENT)
Dept: FAMILY MEDICINE | Age: 38
End: 2023-06-08

## 2023-09-07 ENCOUNTER — TELEPHONE (OUTPATIENT)
Dept: FAMILY MEDICINE | Age: 38
End: 2023-09-07

## 2023-09-07 ENCOUNTER — E-ADVICE (OUTPATIENT)
Dept: FAMILY MEDICINE | Age: 38
End: 2023-09-07

## 2024-02-06 ENCOUNTER — HOSPITAL ENCOUNTER (EMERGENCY)
Age: 39
Discharge: LEFT BEFORE DISCHARGE | End: 2024-02-07

## 2024-02-06 VITALS
OXYGEN SATURATION: 96 % | SYSTOLIC BLOOD PRESSURE: 121 MMHG | HEART RATE: 96 BPM | TEMPERATURE: 97.9 F | DIASTOLIC BLOOD PRESSURE: 81 MMHG | RESPIRATION RATE: 16 BRPM

## 2024-02-06 LAB
ALBUMIN SERPL-MCNC: 4 G/DL (ref 3.6–5.1)
ALBUMIN/GLOB SERPL: 1 {RATIO} (ref 1–2.4)
ALP SERPL-CCNC: 71 UNITS/L (ref 45–117)
ALT SERPL-CCNC: 37 UNITS/L
ANION GAP SERPL CALC-SCNC: 6 MMOL/L (ref 7–19)
APPEARANCE UR: CLEAR
AST SERPL-CCNC: 22 UNITS/L
ATRIAL RATE (BPM): 72
BASOPHILS # BLD: 0 K/MCL (ref 0–0.3)
BASOPHILS NFR BLD: 1 %
BILIRUB SERPL-MCNC: 0.9 MG/DL (ref 0.2–1)
BILIRUB UR QL STRIP: NEGATIVE
BUN SERPL-MCNC: 10 MG/DL (ref 6–20)
BUN/CREAT SERPL: 18 (ref 7–25)
CALCIUM SERPL-MCNC: 9.3 MG/DL (ref 8.4–10.2)
CHLORIDE SERPL-SCNC: 100 MMOL/L (ref 97–110)
CO2 SERPL-SCNC: 30 MMOL/L (ref 21–32)
COLOR UR: ABNORMAL
CREAT SERPL-MCNC: 0.56 MG/DL (ref 0.51–0.95)
DEPRECATED RDW RBC: 38.9 FL (ref 39–50)
EGFRCR SERPLBLD CKD-EPI 2021: >90 ML/MIN/{1.73_M2}
EOSINOPHIL # BLD: 0.1 K/MCL (ref 0–0.5)
EOSINOPHIL NFR BLD: 1 %
ERYTHROCYTE [DISTWIDTH] IN BLOOD: 12 % (ref 11–15)
FASTING DURATION TIME PATIENT: ABNORMAL H
GLOBULIN SER-MCNC: 3.9 G/DL (ref 2–4)
GLUCOSE BLDC GLUCOMTR-MCNC: 305 MG/DL (ref 70–99)
GLUCOSE SERPL-MCNC: 342 MG/DL (ref 70–99)
GLUCOSE UR STRIP-MCNC: >1000 MG/DL
HCG SERPL-ACNC: <2 MUNITS/ML
HCG UR QL: NEGATIVE
HCT VFR BLD CALC: 41.7 % (ref 36–46.5)
HGB BLD-MCNC: 14.3 G/DL (ref 12–15.5)
HGB UR QL STRIP: NEGATIVE
IMM GRANULOCYTES # BLD AUTO: 0 K/MCL (ref 0–0.2)
IMM GRANULOCYTES # BLD: 0 %
KETONES UR STRIP-MCNC: 40 MG/DL
LEUKOCYTE ESTERASE UR QL STRIP: NEGATIVE
LYMPHOCYTES # BLD: 1.7 K/MCL (ref 1–4.8)
LYMPHOCYTES NFR BLD: 30 %
MCH RBC QN AUTO: 30.2 PG (ref 26–34)
MCHC RBC AUTO-ENTMCNC: 34.3 G/DL (ref 32–36.5)
MCV RBC AUTO: 88 FL (ref 78–100)
MONOCYTES # BLD: 0.4 K/MCL (ref 0.3–0.9)
MONOCYTES NFR BLD: 8 %
NEUTROPHILS # BLD: 3.5 K/MCL (ref 1.8–7.7)
NEUTROPHILS NFR BLD: 60 %
NITRITE UR QL STRIP: NEGATIVE
NRBC BLD MANUAL-RTO: 0 /100 WBC
P AXIS (DEGREES): 27
PH UR STRIP: 5 [PH] (ref 5–7)
PLATELET # BLD AUTO: 245 K/MCL (ref 140–450)
POTASSIUM SERPL-SCNC: 4.1 MMOL/L (ref 3.4–5.1)
PR-INTERVAL (MSEC): 176
PROT SERPL-MCNC: 7.9 G/DL (ref 6.4–8.2)
PROT UR STRIP-MCNC: NEGATIVE MG/DL
QRS-INTERVAL (MSEC): 84
QT-INTERVAL (MSEC): 410
QTC: 449
R AXIS (DEGREES): 27
RBC # BLD: 4.74 MIL/MCL (ref 4–5.2)
REPORT TEXT: NORMAL
SODIUM SERPL-SCNC: 132 MMOL/L (ref 135–145)
SP GR UR STRIP: >1.03 (ref 1–1.03)
T AXIS (DEGREES): 27
UROBILINOGEN UR STRIP-MCNC: 0.2 MG/DL
VENTRICULAR RATE EKG/MIN (BPM): 72
WBC # BLD: 5.7 K/MCL (ref 4.2–11)

## 2024-02-06 PROCEDURE — 84702 CHORIONIC GONADOTROPIN TEST: CPT | Performed by: NURSE PRACTITIONER

## 2024-02-06 PROCEDURE — 80053 COMPREHEN METABOLIC PANEL: CPT | Performed by: NURSE PRACTITIONER

## 2024-02-06 PROCEDURE — 93005 ELECTROCARDIOGRAM TRACING: CPT | Performed by: EMERGENCY MEDICINE

## 2024-02-06 PROCEDURE — 84703 CHORIONIC GONADOTROPIN ASSAY: CPT

## 2024-02-06 PROCEDURE — 82962 GLUCOSE BLOOD TEST: CPT

## 2024-02-06 PROCEDURE — 85025 COMPLETE CBC W/AUTO DIFF WBC: CPT | Performed by: NURSE PRACTITIONER

## 2024-02-06 PROCEDURE — 10002803 HB RX 637: Performed by: EMERGENCY MEDICINE

## 2024-02-06 PROCEDURE — 81003 URINALYSIS AUTO W/O SCOPE: CPT | Performed by: NURSE PRACTITIONER

## 2024-02-06 RX ORDER — IBUPROFEN 400 MG/1
400 TABLET ORAL ONCE
Status: COMPLETED | OUTPATIENT
Start: 2024-02-06 | End: 2024-02-06

## 2024-02-06 RX ADMIN — IBUPROFEN 400 MG: 400 TABLET, FILM COATED ORAL at 22:15

## 2024-02-06 ASSESSMENT — PAIN SCALES - WONG BAKER: WONGBAKER_NUMERICALRESPONSE: 6

## 2024-02-07 ENCOUNTER — WALK IN (OUTPATIENT)
Dept: URGENT CARE | Age: 39
End: 2024-02-07

## 2024-02-07 VITALS
TEMPERATURE: 97.8 F | HEIGHT: 68 IN | BODY MASS INDEX: 35.05 KG/M2 | OXYGEN SATURATION: 97 % | SYSTOLIC BLOOD PRESSURE: 142 MMHG | RESPIRATION RATE: 18 BRPM | WEIGHT: 231.26 LBS | DIASTOLIC BLOOD PRESSURE: 85 MMHG | HEART RATE: 74 BPM

## 2024-02-07 DIAGNOSIS — R73.9 HYPERGLYCEMIA: ICD-10-CM

## 2024-02-07 DIAGNOSIS — R14.0 ABDOMINAL BLOATING: Primary | ICD-10-CM

## 2024-02-07 DIAGNOSIS — R10.30 LOWER ABDOMINAL PAIN: ICD-10-CM

## 2024-02-07 PROCEDURE — 99214 OFFICE O/P EST MOD 30 MIN: CPT | Performed by: PHYSICIAN ASSISTANT

## 2024-02-07 PROCEDURE — 3077F SYST BP >= 140 MM HG: CPT | Performed by: PHYSICIAN ASSISTANT

## 2024-02-07 PROCEDURE — 3079F DIAST BP 80-89 MM HG: CPT | Performed by: PHYSICIAN ASSISTANT

## 2024-02-07 ASSESSMENT — ENCOUNTER SYMPTOMS
VOMITING: 0
ABDOMINAL PAIN: 1
FEVER: 0
FATIGUE: 1

## 2024-03-13 ENCOUNTER — TELEPHONE (OUTPATIENT)
Dept: ENDOCRINOLOGY | Age: 39
End: 2024-03-13

## 2024-03-18 ENCOUNTER — TELEPHONE (OUTPATIENT)
Dept: OBGYN | Age: 39
End: 2024-03-18

## 2024-04-13 ENCOUNTER — E-ADVICE (OUTPATIENT)
Dept: OTHER | Age: 39
End: 2024-04-13

## 2024-04-22 ENCOUNTER — TELEPHONE (OUTPATIENT)
Dept: FAMILY MEDICINE | Age: 39
End: 2024-04-22

## 2024-04-22 DIAGNOSIS — E78.5 HYPERLIPIDEMIA, UNSPECIFIED HYPERLIPIDEMIA TYPE: ICD-10-CM

## 2024-04-22 DIAGNOSIS — E11.65 TYPE 2 DIABETES MELLITUS WITH HYPERGLYCEMIA, WITHOUT LONG-TERM CURRENT USE OF INSULIN (CMD): Primary | ICD-10-CM

## 2024-04-23 ENCOUNTER — E-ADVICE (OUTPATIENT)
Dept: ENDOCRINOLOGY | Age: 39
End: 2024-04-23

## 2024-04-24 ENCOUNTER — LAB SERVICES (OUTPATIENT)
Dept: LAB | Age: 39
End: 2024-04-24

## 2024-04-24 ENCOUNTER — APPOINTMENT (OUTPATIENT)
Dept: ENDOCRINOLOGY | Age: 39
End: 2024-04-24

## 2024-04-24 VITALS
HEIGHT: 68 IN | DIASTOLIC BLOOD PRESSURE: 80 MMHG | HEART RATE: 76 BPM | SYSTOLIC BLOOD PRESSURE: 126 MMHG | BODY MASS INDEX: 35.38 KG/M2 | WEIGHT: 233.47 LBS

## 2024-04-24 DIAGNOSIS — E78.5 HYPERLIPIDEMIA, UNSPECIFIED HYPERLIPIDEMIA TYPE: ICD-10-CM

## 2024-04-24 DIAGNOSIS — E66.01 CLASS 2 SEVERE OBESITY DUE TO EXCESS CALORIES WITH SERIOUS COMORBIDITY AND BODY MASS INDEX (BMI) OF 37.0 TO 37.9 IN ADULT (CMD): ICD-10-CM

## 2024-04-24 DIAGNOSIS — E11.65 TYPE 2 DIABETES MELLITUS WITH HYPERGLYCEMIA, WITHOUT LONG-TERM CURRENT USE OF INSULIN (CMD): Primary | ICD-10-CM

## 2024-04-24 DIAGNOSIS — E11.65 TYPE 2 DIABETES MELLITUS WITH HYPERGLYCEMIA, WITHOUT LONG-TERM CURRENT USE OF INSULIN  (CMD): ICD-10-CM

## 2024-04-24 DIAGNOSIS — E78.2 MIXED HYPERLIPIDEMIA: ICD-10-CM

## 2024-04-24 PROCEDURE — 82570 ASSAY OF URINE CREATININE: CPT | Performed by: CLINICAL MEDICAL LABORATORY

## 2024-04-24 PROCEDURE — 82043 UR ALBUMIN QUANTITATIVE: CPT | Performed by: CLINICAL MEDICAL LABORATORY

## 2024-04-24 PROCEDURE — 83036 HEMOGLOBIN GLYCOSYLATED A1C: CPT | Performed by: CLINICAL MEDICAL LABORATORY

## 2024-04-24 PROCEDURE — 80061 LIPID PANEL: CPT | Performed by: CLINICAL MEDICAL LABORATORY

## 2024-04-24 PROCEDURE — 36415 COLL VENOUS BLD VENIPUNCTURE: CPT | Performed by: INTERNAL MEDICINE

## 2024-04-24 PROCEDURE — 83721 ASSAY OF BLOOD LIPOPROTEIN: CPT | Performed by: CLINICAL MEDICAL LABORATORY

## 2024-04-24 RX ORDER — LANCETS
EACH MISCELLANEOUS
Qty: 100 EACH | Refills: 5 | Status: SHIPPED | OUTPATIENT
Start: 2024-04-24

## 2024-04-24 RX ORDER — ACYCLOVIR 400 MG/1
1 TABLET ORAL
Qty: 9 EACH | Refills: 3 | Status: SHIPPED | OUTPATIENT
Start: 2024-04-24

## 2024-04-24 RX ORDER — ACYCLOVIR 400 MG/1
1 TABLET ORAL CONTINUOUS
Qty: 1 EACH | Refills: 0 | Status: SHIPPED | OUTPATIENT
Start: 2024-04-24

## 2024-04-24 RX ORDER — LANCING DEVICE
EACH MISCELLANEOUS
Qty: 1 KIT | Refills: 0 | Status: SHIPPED | OUTPATIENT
Start: 2024-04-24

## 2024-04-24 RX ORDER — POLYETHYLENE GLYCOL-3350 AND ELECTROLYTES 236; 6.74; 5.86; 2.97; 22.74 G/274.31G; G/274.31G; G/274.31G; G/274.31G; G/274.31G
POWDER, FOR SOLUTION ORAL
COMMUNITY
Start: 2024-03-05 | End: 2024-04-24 | Stop reason: ALTCHOICE

## 2024-04-24 RX ORDER — SEMAGLUTIDE 0.68 MG/ML
INJECTION, SOLUTION SUBCUTANEOUS
Qty: 3 ML | Refills: 3 | Status: SHIPPED | OUTPATIENT
Start: 2024-04-24

## 2024-04-24 RX ORDER — INSULIN GLARGINE 100 [IU]/ML
25 INJECTION, SOLUTION SUBCUTANEOUS NIGHTLY
Qty: 15 ML | Refills: 5 | Status: SHIPPED | OUTPATIENT
Start: 2024-04-24

## 2024-04-24 ASSESSMENT — PATIENT HEALTH QUESTIONNAIRE - PHQ9
1. LITTLE INTEREST OR PLEASURE IN DOING THINGS: NOT AT ALL
SUM OF ALL RESPONSES TO PHQ9 QUESTIONS 1 AND 2: 0
CLINICAL INTERPRETATION OF PHQ2 SCORE: NO FURTHER SCREENING NEEDED
2. FEELING DOWN, DEPRESSED OR HOPELESS: NOT AT ALL
SUM OF ALL RESPONSES TO PHQ9 QUESTIONS 1 AND 2: 0

## 2024-04-25 ENCOUNTER — E-ADVICE (OUTPATIENT)
Dept: ENDOCRINOLOGY | Age: 39
End: 2024-04-25

## 2024-04-25 ENCOUNTER — APPOINTMENT (OUTPATIENT)
Dept: OBGYN | Age: 39
End: 2024-04-25

## 2024-04-25 LAB
CHOLEST SERPL-MCNC: 222 MG/DL
CHOLEST/HDLC SERPL: 5.8 {RATIO}
CREAT UR-MCNC: 38.8 MG/DL
HBA1C MFR BLD: 13 % (ref 4.5–5.6)
HDLC SERPL-MCNC: 38 MG/DL
LDLC SERPL CALC-MCNC: ABNORMAL MG/DL
LDLC SERPL DIRECT ASSAY-MCNC: 128 MG/DL
MICROALBUMIN UR-MCNC: 0.56 MG/DL
MICROALBUMIN/CREAT UR: 14.4 MG/G
NONHDLC SERPL-MCNC: 184 MG/DL
TRIGL SERPL-MCNC: 420 MG/DL

## 2024-04-25 RX ORDER — ROSUVASTATIN CALCIUM 40 MG/1
40 TABLET, COATED ORAL DAILY
Qty: 90 TABLET | Refills: 2 | Status: SHIPPED | OUTPATIENT
Start: 2024-04-25

## 2024-05-13 ENCOUNTER — APPOINTMENT (OUTPATIENT)
Dept: OBGYN | Age: 39
End: 2024-05-13

## 2024-09-23 ENCOUNTER — APPOINTMENT (OUTPATIENT)
Dept: ENDOCRINOLOGY | Age: 39
End: 2024-09-23

## 2024-11-21 ENCOUNTER — OFFICE VISIT (OUTPATIENT)
Dept: URGENT CARE | Age: 39
End: 2024-11-21

## 2024-11-21 VITALS
TEMPERATURE: 98.3 F | DIASTOLIC BLOOD PRESSURE: 77 MMHG | HEIGHT: 69 IN | SYSTOLIC BLOOD PRESSURE: 147 MMHG | BODY MASS INDEX: 34.63 KG/M2 | RESPIRATION RATE: 18 BRPM | WEIGHT: 233.8 LBS | OXYGEN SATURATION: 98 % | HEART RATE: 88 BPM

## 2024-11-21 DIAGNOSIS — R10.84 GENERALIZED ABDOMINAL PAIN: ICD-10-CM

## 2024-11-21 DIAGNOSIS — H60.331 SWIMMER'S EAR OF RIGHT SIDE, UNSPECIFIED CHRONICITY: Primary | ICD-10-CM

## 2024-11-21 PROCEDURE — 99214 OFFICE O/P EST MOD 30 MIN: CPT | Performed by: FAMILY MEDICINE

## 2024-11-21 PROCEDURE — 3077F SYST BP >= 140 MM HG: CPT | Performed by: FAMILY MEDICINE

## 2024-11-21 PROCEDURE — 3078F DIAST BP <80 MM HG: CPT | Performed by: FAMILY MEDICINE

## 2024-11-21 RX ORDER — OFLOXACIN 3 MG/ML
10 SOLUTION AURICULAR (OTIC) 2 TIMES DAILY
Qty: 5 ML | Refills: 0 | Status: SHIPPED | OUTPATIENT
Start: 2024-11-21 | End: 2024-11-21 | Stop reason: SDUPTHER

## 2024-11-21 RX ORDER — AMOXICILLIN AND CLAVULANATE POTASSIUM 500; 125 MG/1; MG/1
1 TABLET, FILM COATED ORAL 2 TIMES DAILY
Qty: 14 TABLET | Refills: 0 | Status: SHIPPED | OUTPATIENT
Start: 2024-11-21 | End: 2024-11-21 | Stop reason: SDUPTHER

## 2024-11-21 RX ORDER — OFLOXACIN 3 MG/ML
10 SOLUTION AURICULAR (OTIC) 2 TIMES DAILY
Qty: 5 ML | Refills: 0 | Status: SHIPPED | OUTPATIENT
Start: 2024-11-21

## 2024-11-21 RX ORDER — AMOXICILLIN AND CLAVULANATE POTASSIUM 500; 125 MG/1; MG/1
1 TABLET, FILM COATED ORAL 2 TIMES DAILY
Qty: 14 TABLET | Refills: 0 | Status: SHIPPED | OUTPATIENT
Start: 2024-11-21 | End: 2024-11-28

## 2024-11-27 ENCOUNTER — APPOINTMENT (OUTPATIENT)
Dept: FAMILY MEDICINE | Age: 39
End: 2024-11-27

## 2024-11-27 ENCOUNTER — APPOINTMENT (OUTPATIENT)
Dept: LAB | Age: 39
End: 2024-11-27

## 2024-11-27 VITALS
TEMPERATURE: 97.8 F | RESPIRATION RATE: 18 BRPM | OXYGEN SATURATION: 97 % | SYSTOLIC BLOOD PRESSURE: 125 MMHG | WEIGHT: 224.87 LBS | HEART RATE: 76 BPM | HEIGHT: 68 IN | BODY MASS INDEX: 34.08 KG/M2 | DIASTOLIC BLOOD PRESSURE: 67 MMHG

## 2024-11-27 DIAGNOSIS — Z71.3 DIETARY COUNSELING: ICD-10-CM

## 2024-11-27 DIAGNOSIS — E78.5 HYPERLIPIDEMIA, UNSPECIFIED HYPERLIPIDEMIA TYPE: ICD-10-CM

## 2024-11-27 DIAGNOSIS — E11.65 UNCONTROLLED TYPE 2 DIABETES MELLITUS WITH HYPERGLYCEMIA (CMD): Primary | ICD-10-CM

## 2024-11-27 DIAGNOSIS — Z12.31 ENCOUNTER FOR SCREENING MAMMOGRAM FOR MALIGNANT NEOPLASM OF BREAST: ICD-10-CM

## 2024-11-27 DIAGNOSIS — Z71.82 ENCOUNTER FOR EXERCISE COUNSELING: ICD-10-CM

## 2024-11-27 PROBLEM — R19.4 CHANGE IN BOWEL HABIT: Status: RESOLVED | Noted: 2024-03-07 | Resolved: 2024-11-27

## 2024-11-27 PROBLEM — E66.09 CLASS 1 OBESITY DUE TO EXCESS CALORIES WITH SERIOUS COMORBIDITY AND BODY MASS INDEX (BMI) OF 34.0 TO 34.9 IN ADULT: Status: ACTIVE | Noted: 2021-04-02

## 2024-11-27 PROBLEM — D12.6 TUBULAR ADENOMA OF COLON: Status: ACTIVE | Noted: 2024-11-27

## 2024-11-27 PROBLEM — R10.30 LOWER ABDOMINAL PAIN: Status: RESOLVED | Noted: 2024-03-07 | Resolved: 2024-11-27

## 2024-11-27 PROBLEM — S63.613A SPRAIN OF LEFT MIDDLE FINGER: Status: RESOLVED | Noted: 2022-12-13 | Resolved: 2024-11-27

## 2024-11-27 PROBLEM — O09.521 MULTIGRAVIDA OF ADVANCED MATERNAL AGE IN FIRST TRIMESTER (CMD): Status: RESOLVED | Noted: 2021-09-30 | Resolved: 2024-11-27

## 2024-11-27 PROBLEM — R19.5 MUCOUS IN STOOLS: Status: RESOLVED | Noted: 2021-04-02 | Resolved: 2024-11-27

## 2024-11-27 PROBLEM — E66.811 CLASS 1 OBESITY DUE TO EXCESS CALORIES WITH SERIOUS COMORBIDITY AND BODY MASS INDEX (BMI) OF 34.0 TO 34.9 IN ADULT: Status: ACTIVE | Noted: 2021-04-02

## 2024-11-27 PROBLEM — K57.30 DIVERTICULOSIS OF COLON: Status: ACTIVE | Noted: 2024-11-27

## 2024-11-27 LAB
ALBUMIN SERPL-MCNC: 3.9 G/DL (ref 3.4–5)
ALBUMIN/GLOB SERPL: 1 {RATIO} (ref 1–2.4)
ALP SERPL-CCNC: 70 UNITS/L (ref 45–117)
ALT SERPL-CCNC: 36 UNITS/L
ANION GAP SERPL CALC-SCNC: 9 MMOL/L (ref 7–19)
AST SERPL-CCNC: 22 UNITS/L
BASOPHILS # BLD: 0 K/MCL (ref 0–0.3)
BASOPHILS NFR BLD: 1 %
BILIRUB SERPL-MCNC: 0.5 MG/DL (ref 0.2–1)
BUN SERPL-MCNC: 9 MG/DL (ref 6–20)
BUN/CREAT SERPL: 18 (ref 7–25)
CALCIUM SERPL-MCNC: 9.4 MG/DL (ref 8.4–10.2)
CHLORIDE SERPL-SCNC: 99 MMOL/L (ref 97–110)
CHOLEST SERPL-MCNC: 234 MG/DL
CHOLEST/HDLC SERPL: 4.8 {RATIO}
CO2 SERPL-SCNC: 29 MMOL/L (ref 21–32)
CREAT SERPL-MCNC: 0.5 MG/DL (ref 0.51–0.95)
CREAT UR-MCNC: 163 MG/DL
DEPRECATED RDW RBC: 38.2 FL (ref 39–50)
EGFRCR SERPLBLD CKD-EPI 2021: >90 ML/MIN/{1.73_M2}
EOSINOPHIL # BLD: 0.1 K/MCL (ref 0–0.5)
EOSINOPHIL NFR BLD: 1 %
ERYTHROCYTE [DISTWIDTH] IN BLOOD: 12 % (ref 11–15)
FASTING DURATION TIME PATIENT: ABNORMAL H
GLOBULIN SER-MCNC: 4 G/DL (ref 2–4)
GLUCOSE SERPL-MCNC: 309 MG/DL (ref 70–99)
HBA1C MFR BLD: 12.6 % (ref 4.5–5.6)
HCT VFR BLD CALC: 41.3 % (ref 36–46.5)
HDLC SERPL-MCNC: 49 MG/DL
HGB BLD-MCNC: 14.3 G/DL (ref 12–15.5)
IMM GRANULOCYTES # BLD AUTO: 0 K/MCL (ref 0–0.2)
IMM GRANULOCYTES # BLD: 0 %
LDLC SERPL CALC-MCNC: 145 MG/DL
LYMPHOCYTES # BLD: 1.9 K/MCL (ref 1–4.8)
LYMPHOCYTES NFR BLD: 36 %
MCH RBC QN AUTO: 30 PG (ref 26–34)
MCHC RBC AUTO-ENTMCNC: 34.6 G/DL (ref 32–36.5)
MCV RBC AUTO: 86.6 FL (ref 78–100)
MICROALBUMIN UR-MCNC: 3.4 MG/DL
MICROALBUMIN/CREAT UR: 20.9 MG/G
MONOCYTES # BLD: 0.3 K/MCL (ref 0.3–0.9)
MONOCYTES NFR BLD: 6 %
NEUTROPHILS # BLD: 2.9 K/MCL (ref 1.8–7.7)
NEUTROPHILS NFR BLD: 56 %
NONHDLC SERPL-MCNC: 185 MG/DL
NRBC BLD MANUAL-RTO: 0 /100 WBC
PLATELET # BLD AUTO: 299 K/MCL (ref 140–450)
POTASSIUM SERPL-SCNC: 4 MMOL/L (ref 3.4–5.1)
PROT SERPL-MCNC: 7.9 G/DL (ref 6.4–8.2)
RBC # BLD: 4.77 MIL/MCL (ref 4–5.2)
SODIUM SERPL-SCNC: 133 MMOL/L (ref 135–145)
TRIGL SERPL-MCNC: 199 MG/DL
WBC # BLD: 5.1 K/MCL (ref 4.2–11)

## 2024-11-27 PROCEDURE — 80053 COMPREHEN METABOLIC PANEL: CPT | Performed by: CLINICAL MEDICAL LABORATORY

## 2024-11-27 PROCEDURE — 82043 UR ALBUMIN QUANTITATIVE: CPT | Performed by: CLINICAL MEDICAL LABORATORY

## 2024-11-27 PROCEDURE — 83036 HEMOGLOBIN GLYCOSYLATED A1C: CPT | Performed by: CLINICAL MEDICAL LABORATORY

## 2024-11-27 PROCEDURE — 82570 ASSAY OF URINE CREATININE: CPT | Performed by: CLINICAL MEDICAL LABORATORY

## 2024-11-27 PROCEDURE — 80061 LIPID PANEL: CPT | Performed by: CLINICAL MEDICAL LABORATORY

## 2024-11-27 PROCEDURE — 85025 COMPLETE CBC W/AUTO DIFF WBC: CPT | Performed by: CLINICAL MEDICAL LABORATORY

## 2024-11-27 RX ORDER — LANCING DEVICE
EACH MISCELLANEOUS
Qty: 1 KIT | Refills: 0 | Status: SHIPPED | OUTPATIENT
Start: 2024-11-27

## 2024-11-27 RX ORDER — INSULIN GLARGINE 100 [IU]/ML
50 INJECTION, SOLUTION SUBCUTANEOUS NIGHTLY
Qty: 45 ML | Refills: 0 | Status: SHIPPED | OUTPATIENT
Start: 2024-11-27 | End: 2025-02-25

## 2024-11-27 RX ORDER — ACYCLOVIR 400 MG/1
1 TABLET ORAL
Qty: 9 EACH | Refills: 3 | Status: SHIPPED | OUTPATIENT
Start: 2024-11-27

## 2024-11-27 RX ORDER — SULFAMETHOXAZOLE AND TRIMETHOPRIM 800; 160 MG/1; MG/1
TABLET ORAL
COMMUNITY
Start: 2024-09-18 | End: 2024-11-27 | Stop reason: ALTCHOICE

## 2024-11-27 RX ORDER — ROSUVASTATIN CALCIUM 40 MG/1
40 TABLET, COATED ORAL DAILY
Qty: 90 TABLET | Refills: 3 | Status: SHIPPED | OUTPATIENT
Start: 2024-11-27 | End: 2025-11-27

## 2024-11-27 RX ORDER — PREDNISOLONE ACETATE 10 MG/ML
SUSPENSION/ DROPS OPHTHALMIC
COMMUNITY
Start: 2024-06-19 | End: 2024-11-27 | Stop reason: ALTCHOICE

## 2024-11-27 RX ORDER — LANCETS
EACH MISCELLANEOUS
Qty: 100 EACH | Refills: 5 | Status: SHIPPED | OUTPATIENT
Start: 2024-11-27

## 2024-11-27 RX ORDER — ACYCLOVIR 400 MG/1
1 TABLET ORAL CONTINUOUS
Qty: 1 EACH | Refills: 0 | Status: SHIPPED | OUTPATIENT
Start: 2024-11-27

## 2024-11-27 ASSESSMENT — PATIENT HEALTH QUESTIONNAIRE - PHQ9
SUM OF ALL RESPONSES TO PHQ9 QUESTIONS 1 AND 2: 1
CLINICAL INTERPRETATION OF PHQ2 SCORE: NO FURTHER SCREENING NEEDED
SUM OF ALL RESPONSES TO PHQ9 QUESTIONS 1 AND 2: 1
2. FEELING DOWN, DEPRESSED OR HOPELESS: SEVERAL DAYS
1. LITTLE INTEREST OR PLEASURE IN DOING THINGS: NOT AT ALL

## 2024-11-27 ASSESSMENT — PAIN SCALES - GENERAL: PAINLEVEL: 0

## 2024-12-05 ENCOUNTER — APPOINTMENT (OUTPATIENT)
Dept: MAMMOGRAPHY | Age: 39
End: 2024-12-05
Attending: FAMILY MEDICINE

## 2024-12-05 DIAGNOSIS — Z12.31 ENCOUNTER FOR SCREENING MAMMOGRAM FOR MALIGNANT NEOPLASM OF BREAST: ICD-10-CM

## 2024-12-05 PROCEDURE — 77063 BREAST TOMOSYNTHESIS BI: CPT | Performed by: RADIOLOGY

## 2024-12-05 PROCEDURE — 77067 SCR MAMMO BI INCL CAD: CPT | Performed by: RADIOLOGY

## 2024-12-12 ENCOUNTER — APPOINTMENT (OUTPATIENT)
Dept: FAMILY MEDICINE | Age: 39
End: 2024-12-12

## 2024-12-14 ENCOUNTER — HOSPITAL ENCOUNTER (EMERGENCY)
Facility: HOSPITAL | Age: 39
Discharge: HOME | End: 2024-12-14
Attending: STUDENT IN AN ORGANIZED HEALTH CARE EDUCATION/TRAINING PROGRAM
Payer: COMMERCIAL

## 2024-12-14 ENCOUNTER — APPOINTMENT (OUTPATIENT)
Dept: CT IMAGING | Facility: HOSPITAL | Age: 39
End: 2024-12-14
Attending: STUDENT IN AN ORGANIZED HEALTH CARE EDUCATION/TRAINING PROGRAM
Payer: COMMERCIAL

## 2024-12-14 VITALS
HEART RATE: 98 BPM | TEMPERATURE: 98 F | OXYGEN SATURATION: 94 % | BODY MASS INDEX: 34.1 KG/M2 | DIASTOLIC BLOOD PRESSURE: 68 MMHG | HEIGHT: 68 IN | SYSTOLIC BLOOD PRESSURE: 127 MMHG | RESPIRATION RATE: 20 BRPM | WEIGHT: 225 LBS

## 2024-12-14 DIAGNOSIS — K59.00 CONSTIPATION, UNSPECIFIED CONSTIPATION TYPE: Primary | ICD-10-CM

## 2024-12-14 LAB
ALBUMIN SERPL-MCNC: 4.8 G/DL (ref 3.2–4.8)
ALBUMIN/GLOB SERPL: 1.4 {RATIO} (ref 1–2)
ALP LIVER SERPL-CCNC: 65 U/L
ALT SERPL-CCNC: 23 U/L
ANION GAP SERPL CALC-SCNC: 3 MMOL/L (ref 0–18)
AST SERPL-CCNC: 20 U/L (ref ?–34)
B-HCG UR QL: NEGATIVE
BASOPHILS # BLD AUTO: 0.03 X10(3) UL (ref 0–0.2)
BASOPHILS NFR BLD AUTO: 0.3 %
BILIRUB SERPL-MCNC: 0.6 MG/DL (ref 0.3–1.2)
BILIRUB UR QL: NEGATIVE
BUN BLD-MCNC: 14 MG/DL (ref 9–23)
BUN/CREAT SERPL: 15.7 (ref 10–20)
CALCIUM BLD-MCNC: 10 MG/DL (ref 8.7–10.4)
CHLORIDE SERPL-SCNC: 103 MMOL/L (ref 98–112)
CLARITY UR: CLEAR
CO2 SERPL-SCNC: 31 MMOL/L (ref 21–32)
CREAT BLD-MCNC: 0.89 MG/DL
DEPRECATED RDW RBC AUTO: 40.3 FL (ref 35.1–46.3)
EGFRCR SERPLBLD CKD-EPI 2021: 84 ML/MIN/1.73M2 (ref 60–?)
EOSINOPHIL # BLD AUTO: 0.07 X10(3) UL (ref 0–0.7)
EOSINOPHIL NFR BLD AUTO: 0.6 %
ERYTHROCYTE [DISTWIDTH] IN BLOOD BY AUTOMATED COUNT: 12.3 % (ref 11–15)
GLOBULIN PLAS-MCNC: 3.4 G/DL (ref 2–3.5)
GLUCOSE BLD-MCNC: 269 MG/DL (ref 70–99)
GLUCOSE UR-MCNC: >1000 MG/DL
HCT VFR BLD AUTO: 45.7 %
HGB BLD-MCNC: 15.3 G/DL
HGB UR QL STRIP.AUTO: NEGATIVE
IMM GRANULOCYTES # BLD AUTO: 0.03 X10(3) UL (ref 0–1)
IMM GRANULOCYTES NFR BLD: 0.3 %
KETONES UR-MCNC: 10 MG/DL
LEUKOCYTE ESTERASE UR QL STRIP.AUTO: NEGATIVE
LYMPHOCYTES # BLD AUTO: 1.46 X10(3) UL (ref 1–4)
LYMPHOCYTES NFR BLD AUTO: 12.6 %
MCH RBC QN AUTO: 30.1 PG (ref 26–34)
MCHC RBC AUTO-ENTMCNC: 33.5 G/DL (ref 31–37)
MCV RBC AUTO: 89.8 FL
MONOCYTES # BLD AUTO: 0.72 X10(3) UL (ref 0.1–1)
MONOCYTES NFR BLD AUTO: 6.2 %
NEUTROPHILS # BLD AUTO: 9.31 X10 (3) UL (ref 1.5–7.7)
NEUTROPHILS # BLD AUTO: 9.31 X10(3) UL (ref 1.5–7.7)
NEUTROPHILS NFR BLD AUTO: 80 %
NITRITE UR QL STRIP.AUTO: NEGATIVE
OSMOLALITY SERPL CALC.SUM OF ELEC: 294 MOSM/KG (ref 275–295)
PH UR: 5 [PH] (ref 5–8)
PLATELET # BLD AUTO: 276 10(3)UL (ref 150–450)
POTASSIUM SERPL-SCNC: 4 MMOL/L (ref 3.5–5.1)
PROT SERPL-MCNC: 8.2 G/DL (ref 5.7–8.2)
PROT UR-MCNC: NEGATIVE MG/DL
RBC # BLD AUTO: 5.09 X10(6)UL
SODIUM SERPL-SCNC: 137 MMOL/L (ref 136–145)
SP GR UR STRIP: 1.03 (ref 1–1.03)
UROBILINOGEN UR STRIP-ACNC: NORMAL
WBC # BLD AUTO: 11.6 X10(3) UL (ref 4–11)

## 2024-12-14 PROCEDURE — 80053 COMPREHEN METABOLIC PANEL: CPT

## 2024-12-14 PROCEDURE — 85025 COMPLETE CBC W/AUTO DIFF WBC: CPT | Performed by: STUDENT IN AN ORGANIZED HEALTH CARE EDUCATION/TRAINING PROGRAM

## 2024-12-14 PROCEDURE — 99285 EMERGENCY DEPT VISIT HI MDM: CPT

## 2024-12-14 PROCEDURE — 74177 CT ABD & PELVIS W/CONTRAST: CPT | Performed by: STUDENT IN AN ORGANIZED HEALTH CARE EDUCATION/TRAINING PROGRAM

## 2024-12-14 PROCEDURE — 81025 URINE PREGNANCY TEST: CPT

## 2024-12-14 PROCEDURE — 96375 TX/PRO/DX INJ NEW DRUG ADDON: CPT

## 2024-12-14 PROCEDURE — 80053 COMPREHEN METABOLIC PANEL: CPT | Performed by: STUDENT IN AN ORGANIZED HEALTH CARE EDUCATION/TRAINING PROGRAM

## 2024-12-14 PROCEDURE — 85025 COMPLETE CBC W/AUTO DIFF WBC: CPT

## 2024-12-14 PROCEDURE — 81003 URINALYSIS AUTO W/O SCOPE: CPT | Performed by: STUDENT IN AN ORGANIZED HEALTH CARE EDUCATION/TRAINING PROGRAM

## 2024-12-14 PROCEDURE — 99284 EMERGENCY DEPT VISIT MOD MDM: CPT

## 2024-12-14 PROCEDURE — 96374 THER/PROPH/DIAG INJ IV PUSH: CPT

## 2024-12-14 RX ORDER — INSULIN GLARGINE 100 [IU]/ML
38 INJECTION, SOLUTION SUBCUTANEOUS NIGHTLY
COMMUNITY

## 2024-12-14 RX ORDER — ONDANSETRON 2 MG/ML
4 INJECTION INTRAMUSCULAR; INTRAVENOUS ONCE
Status: COMPLETED | OUTPATIENT
Start: 2024-12-14 | End: 2024-12-14

## 2024-12-14 RX ORDER — ROSUVASTATIN CALCIUM 40 MG/1
40 TABLET, COATED ORAL NIGHTLY
COMMUNITY

## 2024-12-14 RX ORDER — POLYETHYLENE GLYCOL 3350 17 G/17G
17 POWDER, FOR SOLUTION ORAL DAILY PRN
Qty: 12 EACH | Refills: 0 | Status: SHIPPED | OUTPATIENT
Start: 2024-12-14 | End: 2025-01-13

## 2024-12-14 RX ORDER — MORPHINE SULFATE 4 MG/ML
4 INJECTION, SOLUTION INTRAMUSCULAR; INTRAVENOUS ONCE
Status: COMPLETED | OUTPATIENT
Start: 2024-12-14 | End: 2024-12-14

## 2024-12-15 NOTE — DISCHARGE INSTRUCTIONS
Thank you for seeking care at [facility] Emergency Department  You have been seen and evaluated for abdominal pain and discomfort.    In the emergency department, you had Labs, urinalysis, CT abdomen/pelvis  Your testing did not show severe findings, except as noted: Constipation    Read all instructions provided.    Remember, your care process does not end after your visit today. Please follow-up with your doctor within 1-2 days for a follow-up visit to ensure your symptoms are improving, to see if you need any further evaluation/testing, or to evaluate for any alternate diagnoses. Return to the emergency department if you develop severe abdominal pain, severe nausea and vomiting to the point where you are unable to keep down fluids, if you develop chest pain or difficulty breathing, blood in your stool, dizziness or fainting, or if you develop any other new or concerning symptoms as these could be signs of more serious medical illness. Try to stay well hydrated.

## 2024-12-15 NOTE — ED PROVIDER NOTES
[facility] Emergency Department Note  Patient: Janae Camarillo Age: 39 year old Sex: female      MRN: [id number]  : 1985    Patient Seen in: [facility] Emergency Department    History     Chief Complaint   Patient presents with    Abdomen/Flank Pain     Stated Complaint: abd pain    History obtained from: Patient    Patient is a 39-year-old female with a past medical history of hyperlipidemia, diabetes, diverticulosis presenting today for evaluation of left lower quadrant abdominal pain and nausea starting approximately an hour prior to arrival.  No fevers.  No vomiting.  No diarrhea.  States that her symptoms today are similar to her prior episodes of diverticulitis.  No urinary symptoms.    Review of Systems:  Review of Systems  Positive for stated complaint: abd pain. Constitutional and vital signs reviewed. All other systems reviewed and negative except as noted above.    Patient History:  Past Medical History:    Diabetes (HCC)    Diverticulosis    Hyperlipidemia       Past Surgical History:   Procedure Laterality Date          Removal of tonsils,11+ y/o          No family history on file.    Specific Social Determinants of Health:   Social History     Socioeconomic History    Marital status:    Tobacco Use    Smoking status: Never    Smokeless tobacco: Never   Vaping Use    Vaping status: Never Used   Substance and Sexual Activity    Alcohol use: Never    Drug use: Never     Social Drivers of Health     Transportation Needs: No Transportation Needs (10/1/2021)    Received from [facility] [employer]    PRAPARE - Transportation     Lack of Transportation (Medical): No     Lack of Transportation (Non-Medical): No   Physical Activity: Inactive (10/1/2021)    Received from [facility] [employer]    Exercise Vital Sign     Days of Exercise per Week: 0 days     Minutes of Exercise per Session: 0 min           PSFH elements reviewed from today and agreed except as otherwise stated in  HPI.    Physical Exam     ED Triage Vitals [12/14/24 1753]   /77   Pulse 80   Resp 20   Temp 98 °F (36.7 °C)   Temp src    SpO2 99 %   O2 Device        Current:/68   Pulse 98   Temp 98 °F (36.7 °C)   Resp 20   Ht 172.7 cm (5' 8\")   Wt 102.1 kg   LMP 11/13/2024   SpO2 94%   BMI 34.21 kg/m²         Physical Exam  Constitutional:       General: She is not in acute distress.  HENT:      Head: Normocephalic and atraumatic.      Mouth/Throat:      Mouth: Mucous membranes are moist.   Eyes:      Extraocular Movements: Extraocular movements intact.   Cardiovascular:      Rate and Rhythm: Normal rate and regular rhythm.      Heart sounds: Normal heart sounds.   Pulmonary:      Effort: Pulmonary effort is normal. No respiratory distress.      Breath sounds: Normal breath sounds.   Abdominal:      Palpations: Abdomen is soft.      Tenderness: There is abdominal tenderness in the left lower quadrant.   Skin:     General: Skin is warm and dry.      Capillary Refill: Capillary refill takes less than 2 seconds.      Findings: No rash.   Neurological:      General: No focal deficit present.      Mental Status: She is alert and oriented to person, place, and time.   Psychiatric:         Mood and Affect: Mood normal.         Behavior: Behavior normal.         ED Course   Labs:   Labs Reviewed   CBC WITH DIFFERENTIAL WITH PLATELET - Abnormal; Notable for the following components:       Result Value    WBC 11.6 (*)     Neutrophil Absolute Prelim 9.31 (*)     Neutrophil Absolute 9.31 (*)     All other components within normal limits   COMP METABOLIC PANEL (14) - Abnormal; Notable for the following components:    Glucose 269 (*)     All other components within normal limits   URINALYSIS WITH CULTURE REFLEX - Abnormal; Notable for the following components:    Glucose Urine >1000 (*)     Ketones Urine 10 (*)     All other components within normal limits   POCT PREGNANCY URINE - Normal     Radiology findings:  I  personally reviewed the images.   CT ABDOMEN+PELVIS(CONTRAST ONLY)(CPT=74177)    Result Date: 12/14/2024  CONCLUSION:   1. Moderate rectal stool burden which may indicate constipation.  No small bowel obstruction.  The appendix is unremarkable.   2. Circumferential thickening of the urinary bladder.  Recommend correlation with urinalysis to evaluate for cystitis.  3. Hepatic steatosis and hepatomegaly.  No focal suspicious hepatic lesion.  4. Cholelithiasis without evidence of acute cholecystitis.  5. Additional chronic or incidental findings are described in the body of this report.    Dictated by (CST): Zeferino Cabrera MD on 12/14/2024 at 9:07 PM     Finalized by (CST): Zeferino Cabrera MD on 12/14/2024 at 9:11 PM             Cardiac Monitor: Interpreted by me.   Pulse Readings from Last 1 Encounters:   12/14/24 98   , sinus,     External non-ED records reviewed independently by me: Colonoscopy procedure note from 3/12/2024 reviewed confirming findings showing diverticulosis, hemorrhoids, and colorectal polyps.    MDM   39-year-old female with past medical history of hyperlipidemia, diabetes, diverticulosis presenting today for evaluation of left lower quadrant abdominal pain and nausea starting approximately an hour prior to arrival.  On exam, she is well-appearing and in no acute distress.  Has reproducible tenderness in the left lower quadrant of her abdomen with no rebound or guarding.      Differential diagnoses considered includes, but is not limited to: Diverticulitis, constipation, Enteritis, colitis,    Will obtain the following tests: CBC, CMP, urinalysis, urine pregnancy, CT abdomen pelvis with IV contrast  Please see ED course for my independent review of these tests/imaging results.    Initial Medications/Therapeutics administered: Zofran, morphine    Chronic conditions affecting care: Hyperlipidemia, diabetes, diverticulosis    Workup and medications considered but not ordered: None    Social Determinants  of [facility] that impacted care: None     ED course: Labs with nonspecific leukocytosis of 11.6 but otherwise normal renal function.  Normal electrolytes.  Urinalysis without evidence of urinary tract infection.  I independently reviewed the CT abdomen pelvis images that show no evidence of diverticulitis.  Agree with radiology read above.  Upon reevaluation, her pain and nausea have is resolved.  Is tolerating p.o. intake without difficulty.  Suspect her symptoms are secondary to possible constipation.  Discussed trial course of MiraLAX.  Return precautions including any worsening pain, fevers, vomiting, or new or concerning symptoms were discussed and all questions answered.  Patient expressed understanding and agreement with plan.        Disposition and Plan     Clinical Impression:  1. Constipation, unspecified constipation type        Disposition:  Discharge    Follow-up:  Nonstaff, Physician    Schedule an appointment as soon as possible for a visit in 2 day(s)  As needed, If symptoms worsen      Medications Prescribed:  Discharge Medication List as of 12/14/2024 10:21 PM        START taking these medications    Details   polyethylene glycol, PEG 3350, 17 g Oral Powd Pack Take 17 g by mouth daily as needed., Normal, Disp-12 each, R-0               This note may have been created using voice dictation technology and may include inadvertent errors.      Kimber Crocker MD  Emergency Medicine

## 2025-01-09 ENCOUNTER — WALK IN (OUTPATIENT)
Dept: URGENT CARE | Age: 40
End: 2025-01-09

## 2025-01-09 VITALS
OXYGEN SATURATION: 98 % | WEIGHT: 228.73 LBS | HEART RATE: 78 BPM | BODY MASS INDEX: 34.67 KG/M2 | HEIGHT: 68 IN | SYSTOLIC BLOOD PRESSURE: 130 MMHG | DIASTOLIC BLOOD PRESSURE: 78 MMHG | TEMPERATURE: 98 F

## 2025-01-09 DIAGNOSIS — H61.22 IMPACTED CERUMEN OF LEFT EAR: ICD-10-CM

## 2025-01-09 DIAGNOSIS — H66.92 LEFT ACUTE OTITIS MEDIA: Primary | ICD-10-CM

## 2025-01-09 RX ORDER — NEOMYCIN SULFATE, POLYMYXIN B SULFATE, HYDROCORTISONE 3.5; 10000; 1 MG/ML; [USP'U]/ML; MG/ML
3 SOLUTION/ DROPS AURICULAR (OTIC) 4 TIMES DAILY
Qty: 10 ML | Refills: 0 | Status: SHIPPED | OUTPATIENT
Start: 2025-01-09 | End: 2025-01-16

## 2025-01-14 ENCOUNTER — CLINICAL ABSTRACT (OUTPATIENT)
Dept: CARE COORDINATION | Age: 40
End: 2025-01-14

## 2025-02-03 ENCOUNTER — APPOINTMENT (OUTPATIENT)
Dept: FAMILY MEDICINE | Age: 40
End: 2025-02-03

## 2025-02-21 ENCOUNTER — APPOINTMENT (OUTPATIENT)
Dept: FAMILY MEDICINE | Age: 40
End: 2025-02-21

## 2025-05-29 ENCOUNTER — APPOINTMENT (OUTPATIENT)
Dept: OBGYN | Age: 40
End: 2025-05-29

## 2025-05-29 VITALS
DIASTOLIC BLOOD PRESSURE: 78 MMHG | WEIGHT: 225.2 LBS | HEART RATE: 64 BPM | HEIGHT: 68 IN | SYSTOLIC BLOOD PRESSURE: 115 MMHG | BODY MASS INDEX: 34.13 KG/M2

## 2025-05-29 DIAGNOSIS — Z11.51 SCREENING FOR HUMAN PAPILLOMAVIRUS (HPV): ICD-10-CM

## 2025-05-29 DIAGNOSIS — Z11.3 SCREEN FOR STD (SEXUALLY TRANSMITTED DISEASE): ICD-10-CM

## 2025-05-29 DIAGNOSIS — Z12.31 ENCOUNTER FOR SCREENING MAMMOGRAM FOR MALIGNANT NEOPLASM OF BREAST: ICD-10-CM

## 2025-05-29 DIAGNOSIS — Z01.419 WELL WOMAN EXAM WITH ROUTINE GYNECOLOGICAL EXAM: Primary | ICD-10-CM

## 2025-05-29 DIAGNOSIS — Z87.42 HISTORY OF PCOS: ICD-10-CM

## 2025-05-29 PROCEDURE — 3074F SYST BP LT 130 MM HG: CPT | Performed by: OBSTETRICS & GYNECOLOGY

## 2025-05-29 PROCEDURE — 3078F DIAST BP <80 MM HG: CPT | Performed by: OBSTETRICS & GYNECOLOGY

## 2025-05-29 PROCEDURE — 99386 PREV VISIT NEW AGE 40-64: CPT | Performed by: OBSTETRICS & GYNECOLOGY

## 2025-05-29 SDOH — ECONOMIC STABILITY: HOUSING INSECURITY: DO YOU HAVE PROBLEMS WITH ANY OF THE FOLLOWING?: NONE OF THE ABOVE

## 2025-05-29 SDOH — ECONOMIC STABILITY: FOOD INSECURITY: WITHIN THE PAST 12 MONTHS, THE FOOD YOU BOUGHT JUST DIDN'T LAST AND YOU DIDN'T HAVE MONEY TO GET MORE.: NEVER TRUE

## 2025-05-29 SDOH — ECONOMIC STABILITY: TRANSPORTATION INSECURITY
IN THE PAST 12 MONTHS, HAS LACK OF RELIABLE TRANSPORTATION KEPT YOU FROM MEDICAL APPOINTMENTS, MEETINGS, WORK OR FROM GETTING THINGS NEEDED FOR DAILY LIVING?: NO

## 2025-05-29 SDOH — ECONOMIC STABILITY: HOUSING INSECURITY: WHAT IS YOUR LIVING SITUATION TODAY?: I HAVE A STEADY PLACE TO LIVE

## 2025-05-29 ASSESSMENT — SOCIAL DETERMINANTS OF HEALTH (SDOH): IN THE PAST 12 MONTHS, HAS THE ELECTRIC, GAS, OIL, OR WATER COMPANY THREATENED TO SHUT OFF SERVICE IN YOUR HOME?: NO

## 2025-05-30 LAB
C TRACH RRNA CVX QL NAA+PROBE: NEGATIVE
N GONORRHOEA RRNA CVX QL NAA+PROBE: NEGATIVE
SERVICE CMNT-IMP: NORMAL
SERVICE CMNT-IMP: NORMAL
T VAGINALIS RRNA CVX QL NAA+PROBE: NEGATIVE

## 2025-06-05 LAB
CASE RPRT: NORMAL
CYTOLOGY CVX/VAG STUDY: NORMAL
CYTOLOGY CVX/VAG STUDY: NORMAL
HPV16+18+45 E6+E7MRNA CVX NAA+PROBE: NEGATIVE
PAP EDUCATIONAL NOTE: NORMAL
PATH REPORT.ADDENDUM SPEC: NORMAL
SERVICE CMNT-IMP: NORMAL
STAT OF ADQ CVX/VAG CYTO-IMP: NORMAL

## 2025-06-23 ENCOUNTER — APPOINTMENT (OUTPATIENT)
Dept: FAMILY MEDICINE | Age: 40
End: 2025-06-23

## 2025-06-23 VITALS
WEIGHT: 227.85 LBS | BODY MASS INDEX: 34.53 KG/M2 | OXYGEN SATURATION: 99 % | HEIGHT: 68 IN | SYSTOLIC BLOOD PRESSURE: 113 MMHG | TEMPERATURE: 98.4 F | HEART RATE: 76 BPM | DIASTOLIC BLOOD PRESSURE: 67 MMHG

## 2025-06-23 DIAGNOSIS — E66.01 SEVERE OBESITY (BMI 35.0-39.9) WITH COMORBIDITY  (CMD): ICD-10-CM

## 2025-06-23 DIAGNOSIS — E78.5 HYPERLIPIDEMIA, UNSPECIFIED HYPERLIPIDEMIA TYPE: ICD-10-CM

## 2025-06-23 DIAGNOSIS — E11.69 TYPE 2 DIABETES MELLITUS WITH OBESITY  (CMD): ICD-10-CM

## 2025-06-23 DIAGNOSIS — E66.9 TYPE 2 DIABETES MELLITUS WITH OBESITY  (CMD): ICD-10-CM

## 2025-06-23 DIAGNOSIS — I83.893 VARICOSE VEINS OF BOTH LEGS WITH EDEMA: ICD-10-CM

## 2025-06-23 DIAGNOSIS — Z13.6 SCREENING, ISCHEMIC HEART DISEASE: ICD-10-CM

## 2025-06-23 DIAGNOSIS — E11.65 UNCONTROLLED TYPE 2 DIABETES MELLITUS WITH HYPERGLYCEMIA (CMD): Primary | ICD-10-CM

## 2025-06-23 PROCEDURE — 3074F SYST BP LT 130 MM HG: CPT | Performed by: FAMILY MEDICINE

## 2025-06-23 PROCEDURE — 99215 OFFICE O/P EST HI 40 MIN: CPT | Performed by: FAMILY MEDICINE

## 2025-06-23 PROCEDURE — 3078F DIAST BP <80 MM HG: CPT | Performed by: FAMILY MEDICINE

## 2025-06-23 PROCEDURE — G2211 COMPLEX E/M VISIT ADD ON: HCPCS | Performed by: FAMILY MEDICINE

## 2025-06-23 RX ORDER — TIRZEPATIDE 5 MG/.5ML
5 INJECTION, SOLUTION SUBCUTANEOUS
Qty: 2 ML | Refills: 3 | Status: SHIPPED | OUTPATIENT
Start: 2025-07-21

## 2025-06-23 RX ORDER — TIRZEPATIDE 2.5 MG/.5ML
2.5 INJECTION, SOLUTION SUBCUTANEOUS
Qty: 2 ML | Refills: 0 | Status: SHIPPED | OUTPATIENT
Start: 2025-06-23 | End: 2025-07-21

## 2025-06-23 RX ORDER — INSULIN GLARGINE 100 [IU]/ML
50 INJECTION, SOLUTION SUBCUTANEOUS NIGHTLY
Qty: 45 EACH | Refills: 1 | Status: SHIPPED | OUTPATIENT
Start: 2025-06-23 | End: 2025-06-24 | Stop reason: SDUPTHER

## 2025-06-23 RX ORDER — ACYCLOVIR 400 MG/1
1 TABLET ORAL
Qty: 9 EACH | Refills: 3 | Status: SHIPPED | OUTPATIENT
Start: 2025-06-23

## 2025-06-23 ASSESSMENT — PAIN SCALES - GENERAL: PAINLEVEL_OUTOF10: 0

## 2025-06-23 ASSESSMENT — PATIENT HEALTH QUESTIONNAIRE - PHQ9
SUM OF ALL RESPONSES TO PHQ9 QUESTIONS 1 AND 2: 0
CLINICAL INTERPRETATION OF PHQ2 SCORE: NO FURTHER SCREENING NEEDED
2. FEELING DOWN, DEPRESSED OR HOPELESS: NOT AT ALL
1. LITTLE INTEREST OR PLEASURE IN DOING THINGS: NOT AT ALL
SUM OF ALL RESPONSES TO PHQ9 QUESTIONS 1 AND 2: 0

## 2025-06-24 LAB
ANION GAP SERPL CALC-SCNC: 9 MMOL/L (ref 7–19)
BUN SERPL-MCNC: 11 MG/DL (ref 6–20)
BUN/CREAT SERPL: 18 (ref 7–25)
CALCIUM SERPL-MCNC: 9.4 MG/DL (ref 8.4–10.2)
CHLORIDE SERPL-SCNC: 100 MMOL/L (ref 97–110)
CO2 SERPL-SCNC: 29 MMOL/L (ref 21–32)
CREAT SERPL-MCNC: 0.62 MG/DL (ref 0.51–0.95)
CREAT UR-MCNC: 76.7 MG/DL
EGFRCR SERPLBLD CKD-EPI 2021: >90 ML/MIN/{1.73_M2}
FASTING DURATION TIME PATIENT: ABNORMAL H
GLUCOSE SERPL-MCNC: 299 MG/DL (ref 70–99)
HBA1C MFR BLD: 10.7 % (ref 4.5–5.6)
MICROALBUMIN UR-MCNC: 0.82 MG/DL
MICROALBUMIN/CREAT UR: 10.7 MG/G
POTASSIUM SERPL-SCNC: 3.9 MMOL/L (ref 3.4–5.1)
SODIUM SERPL-SCNC: 134 MMOL/L (ref 135–145)

## (undated) DEVICE — Device

## (undated) DEVICE — GOWN SURG XL L3 NONREINFORCE SET IN SLV STRL LF DISP BLUE

## (undated) DEVICE — ELECTRODE PT RTN C30- LB 9FT CORD NONIRRITATE NONSENSITIZE

## (undated) DEVICE — PEN SURGMRK DEVON SKIN DISP NONSMEAR REG TIP FLXB RULER LBL

## (undated) DEVICE — 2% CHLORHEXIDINE SKIN PREP ORANGE 26ML

## (undated) DEVICE — TUBING SCT CLR 12FT 316IN MDVC MAXI-GRIP NCDTV MALE TO MALE

## (undated) DEVICE — GLOVE SURG 6 PROTEXIS LF CRM PF BEAD CUFF STRL PLISPRN 11.5

## (undated) DEVICE — GLOVE SURG 6.5 PROTEXIS LF CRM PF BEAD CUFF STRL PLISPRN

## (undated) DEVICE — ADHESIVE LIQUID LF WTPRF VIAL PREP NONSTAIN MASTISOL STYRAX

## (undated) DEVICE — ADHESIVE PREMIERPRO EXOFIN 1ML HVISC TUBE SOFT FLXB APL

## (undated) DEVICE — KIT SYRINGE 25GA 1ML 58IN ABG LS TIP PREATTACH NDL PRVNT +

## (undated) DEVICE — HANDLE RGD PLASTIC STRL LF DISP DEVON EZ HNDL SURGLT

## (undated) DEVICE — HANDPIECE SCT POOLE STRL LF DISP

## (undated) DEVICE — PUMP VAC MYSTIC 2 M-STYLE MSHRM CUP ROTATE HNDL SEMIRIGID

## (undated) DEVICE — PENCIL SMKEVC COAT PSHBTN LF

## (undated) DEVICE — SPONGE STICK POVIDONE IODINE

## (undated) DEVICE — TRAY CATH SURESTEP LUBRI-SIL STLK 16FR 350ML FOLEY URMTR

## (undated) DEVICE — SCALPEL SURG 10 BLADE D INDCTR SPCL GRV HNDL STRL DISP PRSNA